# Patient Record
Sex: MALE | Race: WHITE | HISPANIC OR LATINO | Employment: OTHER | URBAN - METROPOLITAN AREA
[De-identification: names, ages, dates, MRNs, and addresses within clinical notes are randomized per-mention and may not be internally consistent; named-entity substitution may affect disease eponyms.]

---

## 2017-01-26 ENCOUNTER — HOSPITAL ENCOUNTER (EMERGENCY)
Facility: HOSPITAL | Age: 32
Discharge: HOME | End: 2017-01-26
Attending: EMERGENCY MEDICINE
Payer: MEDICAID

## 2017-01-26 VITALS
BODY MASS INDEX: 26.63 KG/M2 | RESPIRATION RATE: 18 BRPM | DIASTOLIC BLOOD PRESSURE: 62 MMHG | TEMPERATURE: 98 F | WEIGHT: 170 LBS | SYSTOLIC BLOOD PRESSURE: 120 MMHG | OXYGEN SATURATION: 97 % | HEART RATE: 56 BPM

## 2017-01-26 DIAGNOSIS — S93.401A SPRAIN OF RIGHT ANKLE, UNSPECIFIED LIGAMENT, INITIAL ENCOUNTER: Primary | ICD-10-CM

## 2017-01-26 DIAGNOSIS — R60.9 EDEMA: ICD-10-CM

## 2017-01-26 PROCEDURE — 99283 EMERGENCY DEPT VISIT LOW MDM: CPT

## 2017-01-26 PROCEDURE — 25000003 PHARM REV CODE 250: Performed by: EMERGENCY MEDICINE

## 2017-01-26 RX ORDER — IBUPROFEN 400 MG/1
800 TABLET ORAL
Status: COMPLETED | OUTPATIENT
Start: 2017-01-26 | End: 2017-01-26

## 2017-01-26 RX ORDER — IBUPROFEN 800 MG/1
800 TABLET ORAL EVERY 6 HOURS PRN
Qty: 20 TABLET | Refills: 0 | Status: SHIPPED | OUTPATIENT
Start: 2017-01-26 | End: 2017-06-26

## 2017-01-26 RX ADMIN — IBUPROFEN 800 MG: 400 TABLET, FILM COATED ORAL at 06:01

## 2017-01-26 NOTE — DISCHARGE INSTRUCTIONS
¿Qué es un esguince de tobillo?    El tobillo es la articulación en la que se unen la pierna y el pie. Los huesos están sujetados por bandas de tejido conectivo llamadas ligamentos. Cuando los ligamentos del tobillo se estiran hasta el punto de lesionarse y causar dolor, se trata de un esguince de tobillo. Un esguince puede desgarrar los ligamentos. Esos desgarros pueden ser muy pequeños, sherly aun así causar dolor. Los esguinces de tobillo pueden ser leves o graves.  ¿Cuáles son las causas de un esguince de tobillo?  Un esguince puede ocurrir si se tuerce el tobillo o lo dobla demasiado. [place] puede pasarle si tropieza o se . Las cosas que podrían aumentar las probabilidades de tener un esguince de tobillo incluyen:  · Jackie tenido un esguince de tobillo antes  · Practicar deportes que impliquen correr y saltar, o deportes de contacto tales jaylan el fútbol o el hockey  · Usar calzado que no brinde buen soporte a ginger pies y ginger tobillos  · Tener tobillos débiles y poco flexibles  Síntomas de un esguince de tobillo  Los síntomas pueden incluir lo siguiente:  · Dolor o sensibilidad en el tobillo  · Hinchazón  · Enrojecimiento o moretones  · No poder caminar o cargar peso sobre el pie afectado  · Deshawn amplitud de movimiento en el tobillo  · Sensación de desgarro o rotura en el momento en que se produce el esguince  · El tobillo se ve anormal o dislocado  · Falta de estabilidad o demasiada amplitud de movimiento en el tobillo  Tratamiento de un esguince de tobillo  El tratamiento se centra en reducir el dolor y la inflamación, y evitar ronda lesión mayor. Los tratamientos pueden incluir lo siguiente:  · Hacer reposo del tobillo. Evite cargar peso sobre el tobillo. [place] puede significar usar muletas hasta tanto el esguince se sane.  · Medicamentos analgésicos (calmantes del dolor) recetados o de venta jc. Ayudan a reducir la inflamación y el dolor.  · Compresas frías. Ayudan a reducir el dolor y la  inflamación.  · Elevar el tobillo por encima del nivel de mendoza corazón. [place] ayuda a reducir la hinchazón.  · Envolver el tobillo con ronda venda elástica o ronda tobillera. [place] ayuda a reducir la inflamación y alvaro algo de soporte al tobillo. En casos raros, puede necesitar un yeso o ronda bota ortopédica.  · Estiramiento y otros ejercicios. Mejoran la flexibilidad y la fuerza.  · Compresas de calor. Es posible que le recomienden usarlas antes de hacer ejercicios con los tobillos.  Posibles complicaciones de un esguince de tobillo  Si el tobillo ya está debilitado por mauro tenido un esguince, tiene más probabilidades de tener otros esguinces en el futuro. Hacer ejercicios para fortalecer mendoza tobillo y mejorar mendoza equilibrio puede reducir mendoza riesgo de tener futuros esguinces. Otras posibles complicaciones son dolor a flakito plazo (crónico) o que el tobillo quede inestable.  Cuándo llamar a mendoza proveedor de atención médica  Llame a mendoza proveedor de atención médica de inmediato si nota alguno de los siguientes síntomas:  · Fiebre de 100.4º F (38º C) o superior, o según le indiquen  · Dolor, entumecimiento, decoloración o frío en el pie o los dedos del pie  · Dolor que empeora  · Los síntomas no mejoran, o empeoran  · Síntomas nuevos   © [id number] [employer]. [address] [place] [address]. Todos los derechos reservados. Esta información no pretende sustituir la atención médica profesional. Sólo mendoza médico puede diagnosticar y tratar un problema de elvira.

## 2017-01-26 NOTE — ED AVS SNAPSHOT
[facility]  [address]  [place] [address]               [facility]   2017  5:53 AM   ED    Descripción:  Male : 1985   [facility]           Ortiz Cuidado fue coordinado por:     Provider Role From To    Capri Gonzalez MD Attending Provider [phone] --      Razón de la acacia     Ankle Pain           Diagnósticos de Esta Visita        Comentarios    Sprain of right ankle, unspecified ligament, initial encounter    -  Primario     Edema           ED Disposition     Ninguna           Lista de tareas           Información de seguimiento     Realice un seguimiento con[facility]  [facility]    Especialidad:  Emergency Medicine    Por qué:  If symptoms worsen    Información de contacto:    [address] [place] [address] Nasreen [place] [id number] [phone]      Recetas para recoger        Disp Refills Start End    ibuprofen (ADVIL,MOTRIN) 800 MG tablet 20 tablet 0 2017     Take 1 tablet (800 mg total) by mouth every 6 (six) hours as needed for Pain. - Oral      [facility] en [facility]lamada     [facility] En [facility] [place] de Enfermeras - Asistencia   Enfermeras registradas de [facility] pueden ayudarle a reservar ronda acacia, proveer educación para la elvira, asesoría clínica, y otros servicios de asesoramiento.   Llame para wendy servicio gratuito a [phone].             Medicamentos           Mensaje sobre Medicamentos     Verificar los cambios y / o adiciones a ortiz régimen de medicación son los mismos que discutir con ortiz médico. Si cualquiera de estos cambios o adiciones son incorrectos, por favor notifique a ortiz proveedor de atención médica.        EMPEZAR a pranay estos medicamentos NUEVOS        Refills    ibuprofen (ADVIL,MOTRIN) 800 MG tablet 0    Sig: Take 1 tablet (800 mg total) by mouth every 6 (six) hours as needed for Pain.    Categoría: Print    Vía: Oral      These medications were administered today         Dose Freq    ibuprofen tablet 800 mg 800 mg ED 1 Time    Sig: Take 2 tablets (800 mg total) by mouth ED 1 Time.    Categoría: Normal    Vía: Oral           Verifique que la siguiente lista de medicamentos es ronda representación exacta de los medicamentos que está tomando actualmente. Si no hay ningunos reportados, la lista puede estar en galarza. Si no es correcta, por favor póngase en contacto con mendoza proveedor de atención médica. Lleve esta lista con usted en luiz de emergencia.           Medicamentos Actuales     benzonatate (TESSALON) 100 MG capsule Take 1 capsule (100 mg total) by mouth 3 (three) times daily as needed for Cough.    ibuprofen (ADVIL,MOTRIN) 800 MG tablet Take 1 tablet (800 mg total) by mouth every 6 (six) hours as needed for Pain.           Información de referencia clínica           Tere signos vitales bee     PS Pulso Temperatura Resp Peso SpO2    120/62 (BP Location: Right arm, Patient Position: Sitting) 56 98.1 °F (36.7 °C) (Oral) 18 77.1 kg (170 lb) 97%    BMI (IMC)                   26.63 kg/m2           Alergias     A partir del:  1/26/2017           Reacciones    Penicillins       Vacunas     Administradas en la fecha de la visita:  1/26/2017        None      ED Micro, Lab, POCT     None      ED Imaging Orders     Start Ordered       Status Ordering Provider    01/26/17 0613 01/26/17 0612  X-Ray Ankle Complete Right  1 time imaging      Final result     01/26/17 0613 01/26/17 0612  X-Ray Foot Complete Right  1 time imaging      Final result         Instrucciones a yumiko de nicolasa         ¿Qué es un esguince de tobillo?    El tobillo es la articulación en la que se unen la pierna y el pie. Los huesos están sujetados por bandas de tejido conectivo llamadas ligamentos. Cuando los ligamentos del tobillo se estiran hasta el punto de lesionarse y causar dolor, se trata de un esguince de tobillo. Un esguince puede desgarrar los ligamentos. Esos desgarros pueden ser muy pequeños, sherly aun así causar  dolor. Los esguinces de tobillo pueden ser leves o graves.  ¿Cuáles son las causas de un esguince de tobillo?  Un esguince puede ocurrir si se tuerce el tobillo o lo dobla demasiado. [place] puede pasarle si tropieza o se . Las cosas que podrían aumentar las probabilidades de tener un esguince de tobillo incluyen:  · Jackie tenido un esguince de tobillo antes  · Practicar deportes que impliquen correr y saltar, o deportes de contacto tales jaylan el fútbol o el hockey  · Usar calzado que no brinde buen soporte a ginger pies y ginger tobillos  · Tener tobillos débiles y poco flexibles  Síntomas de un esguince de tobillo  Los síntomas pueden incluir lo siguiente:  · Dolor o sensibilidad en el tobillo  · Hinchazón  · Enrojecimiento o moretones  · No poder caminar o cargar peso sobre el pie afectado  · [place]hawn amplitud de movimiento en el tobillo  · Sensación de desgarro o rotura en el momento en que se produce el esguince  · El tobillo se ve anormal o dislocado  · Falta de estabilidad o demasiada amplitud de movimiento en el tobillo  Tratamiento de un esguince de tobillo  El tratamiento se centra en reducir el dolor y la inflamación, y evitar ronda lesión mayor. Los tratamientos pueden incluir lo siguiente:  · Hacer reposo del tobillo. Evite cargar peso sobre el tobillo. [place] puede significar usar muletas hasta tanto el esguince se sane.  · Medicamentos analgésicos (calmantes del dolor) recetados o de venta jc. Ayudan a reducir la inflamación y el dolor.  · Compresas frías. Ayudan a reducir el dolor y la inflamación.  · Elevar el tobillo por encima del nivel de mendoza corazón. [place] ayuda a reducir la hinchazón.  · Envolver el tobillo con ronda venda elástica o ronda tobillera. [place] ayuda a reducir la inflamación y alvaro algo de soporte al tobillo. En casos raros, puede necesitar un yeso o ronda bota ortopédica.  · Estiramiento y otros ejercicios. Mejoran la flexibilidad y la fuerza.  · Compresas de calor. Es posible que le recomienden  usarlas antes de hacer ejercicios con los tobillos.  Posibles complicaciones de un esguince de tobillo  Si el tobillo ya está debilitado por mauro tenido un esguince, tiene más probabilidades de tener otros esguinces en el futuro. Hacer ejercicios para fortalecer mendoza tobillo y mejorar mendoza equilibrio puede reducir mendoza riesgo de tener futuros esguinces. Otras posibles complicaciones son dolor a flakito plazo (crónico) o que el tobillo quede inestable.  Cuándo llamar a mendoza proveedor de atención médica  Llame a mendoza proveedor de atención médica de inmediato si nota alguno de los siguientes síntomas:  · Fiebre de 100.4º F (38º C) o superior, o según le indiquen  · Dolor, entumecimiento, decoloración o frío en el pie o los dedos del pie  · Dolor que empeora  · Los síntomas no mejoran, o empeoran  · Síntomas nuevos   © 0587-8372 [employer]. [address] [place] [address]. Todos los derechos reservados. Esta información no pretende sustituir la atención médica profesional. Sólo mendoza médico puede diagnosticar y tratar un problema de elvira.          Registrarse para [facility]     La activación de mendoza cuenta [facility] es tan fácil jaylan 1-2-3!    1) Ir a [email or web address], seleccione Registrarse Ahora, meter el código de activación y mendoza fecha de nacimiento, y seleccione Próximo.    [id number]  Expires: 3/12/2017  7:03 AM      2) Crear un nombre de usuario y contraseña para usar cuando se visita [facility] en el futuro y selecciona ronda pregunta de seguridad en luiz de que pierda mendoza contraseña y seleccione Próximo.    3) Introduzca mendoza dirección de correo electrónico y amina mallika en Registrarse!    Información Adicional  Si tiene alguna pregunta, por favor, e-mail [email or web address] o llame al [phone] para hablar con nuestro personal. Recuerde, [facility] no debe ser usada para necesidades urgentes. En luiz de emergencia médica, llleroy al [phone].         [facility] cumple con las leyes  federales aplicables de derechos civiles y no discrimina por motivos de kristel, color, origen nacional, edad, discapacidad, o sexo.        Language Assistance Services     ATTENTION: Language assistance services are available, free of charge. Please call [phone].      ATENCIÓN: Si habla pritesh, tiene a mendoza disposición servicios gratuitos de asistencia lingüística. Llame al [phone].     CHÚ Ý: N?u b?n nói Ti?ng Vi?t, có các d?ch v? h? tr? ngôn ng? mi?n phí dành cho b?n. G?i s? [phone].                      [facility]  [address]  [place] [address]               Lm Yip   2017  5:53 AM   ED    Description:  Male : 1985   Department:  [facility]           Your Care was Coordinated By:     Provider Role From To    Capri Gonzalez MD Attending Provider [phone] --      Reason for Visit     Ankle Pain           Diagnoses this Visit        Comments    Sprain of right ankle, unspecified ligament, initial encounter    -  Primary     Edema           ED Disposition     None           To Do List           Follow-up Information     Follow up with [facility].    Specialty:  Emergency Medicine    Why:  If symptoms worsen    Contact information:    [address] [place], [place] [address]kishore  [place] [address] [phone]       These Medications        Disp Refills Start End    ibuprofen (ADVIL,MOTRIN) 800 MG tablet 20 tablet 0 2017     Take 1 tablet (800 mg total) by mouth every 6 (six) hours as needed for Pain. - Oral      [facility]     [facility] Care [facility] -  Assistance  Registered nurses in the [facility] provide clinical advisement, health education, appointment booking, and other advisory services.  Call for this free service at [phone].             Medications           Message regarding Medications     Verify the changes and/or additions to your medication  regime listed below are the same as discussed with your clinician today.  If any of these changes or additions are incorrect, please notify your healthcare provider.        START taking these NEW medications        Refills    ibuprofen (ADVIL,MOTRIN) 800 MG tablet 0    Sig: Take 1 tablet (800 mg total) by mouth every 6 (six) hours as needed for Pain.    Class: Print    Route: Oral      These medications were administered today        Dose Freq    ibuprofen tablet 800 mg 800 mg ED 1 Time    Sig: Take 2 tablets (800 mg total) by mouth ED 1 Time.    Class: Normal    Route: Oral           Verify that the below list of medications is an accurate representation of the medications you are currently taking.  If none reported, the list may be blank. If incorrect, please contact your healthcare provider. Carry this list with you in case of emergency.           Current Medications     benzonatate (TESSALON) 100 MG capsule Take 1 capsule (100 mg total) by mouth 3 (three) times daily as needed for Cough.    ibuprofen (ADVIL,MOTRIN) 800 MG tablet Take 1 tablet (800 mg total) by mouth every 6 (six) hours as needed for Pain.           Clinical Reference Information           Your Vitals Were     BP Pulse Temp Resp Weight SpO2    120/62 (BP Location: Right arm, Patient Position: Sitting) 56 98.1 °F (36.7 °C) (Oral) 18 77.1 kg (170 lb) 97%    BMI                   26.63 kg/m2           Allergies as of 1/26/2017        Reactions    Penicillins       Immunizations Administered on Date of Encounter - 1/26/2017     None      ED Micro, Lab, POCT     None      ED Imaging Orders     Start Ordered       Status Ordering Provider    01/26/17 0613 01/26/17 0612  X-Ray Ankle Complete Right  1 time imaging      Final result     01/26/17 0613 01/26/17 0612  X-Ray Foot Complete Right  1 time imaging      Final result         Discharge Instructions         ¿Qué es un esguince de tobillo?    El tobillo es la articulación en la que se unen la pierna y  el pie. Los huesos están sujetados por bandas de tejido conectivo llamadas ligamentos. Cuando los ligamentos del tobillo se estiran hasta el punto de lesionarse y causar dolor, se trata de un esguince de tobillo. Un esguince puede desgarrar los ligamentos. Esos desgarros pueden ser muy pequeños, [place] aun así causar dolor. Los esguinces de tobillo pueden ser leves o graves.  ¿Cuáles son las causas de un esguince de tobillo?  Un esguince puede ocurrir si se tuerce el tobillo o lo dobla demasiado. [place] puede pasarle si tropieza o se . Las cosas que podrían aumentar las probabilidades de tener un esguince de tobillo incluyen:  · Jackie tenido un esguince de tobillo antes  · Practicar deportes que impliquen correr y saltar, o deportes de contacto tales jaylan el fútbol o el hockey  · Usar calzado que no brinde buen soporte a ginger pies y ginger tobillos  · Tener tobillos débiles y poco flexibles  Síntomas de un esguince de tobillo  Los síntomas pueden incluir lo siguiente:  · Dolor o sensibilidad en el tobillo  · Hinchazón  · Enrojecimiento o moretones  · No poder caminar o cargar peso sobre el pie afectado  · [place] amplitud de movimiento en el tobillo  · Sensación de desgarro o rotura en el momento en que se produce el esguince  · El tobillo se ve anormal o dislocado  · Falta de estabilidad o demasiada amplitud de movimiento en el tobillo  Tratamiento de un esguince de tobillo  El tratamiento se centra en reducir el dolor y la inflamación, y evitar ronda lesión mayor. Los tratamientos pueden incluir lo siguiente:  · Hacer reposo del tobillo. Evite cargar peso sobre el tobillo. [place] puede significar usar muletas hasta tanto el esguince se sane.  · Medicamentos analgésicos (calmantes del dolor) recetados o de venta jc. Ayudan a reducir la inflamación y el dolor.  · Compresas frías. Ayudan a reducir el dolor y la inflamación.  · Elevar el tobillo por encima del nivel de mendoza corazón. [place] ayuda a reducir la  hinchazón.  · Envolver el tobillo con ronda venda elástica o rodna tobillera. [place] ayuda a reducir la inflamación y alvaro algo de soporte al tobillo. En casos raros, puede necesitar un yeso o ronda bota ortopédica.  · Estiramiento y otros ejercicios. Mejoran la flexibilidad y la fuerza.  · Compresas de calor. Es posible que le recomienden usarlas antes de hacer ejercicios con los tobillos.  Posibles complicaciones de un esguince de tobillo  Si el tobillo ya está debilitado por mauro tenido un esguince, tiene más probabilidades de tener otros esguinces en el futuro. Hacer ejercicios para fortalecer mendoza tobillo y mejorar mendoza equilibrio puede reducir mendoza riesgo de tener futuros esguinces. Otras posibles complicaciones son dolor a flakito plazo (crónico) o que el tobillo quede inestable.  Cuándo llamar a mendoza proveedor de atención médica  Llame a mendoza proveedor de atención médica de inmediato si nota alguno de los siguientes síntomas:  · Fiebre de 100.4º F (38º C) o superior, o según le indiquen  · Dolor, entumecimiento, decoloración o frío en el pie o los dedos del pie  · Dolor que empeora  · Los síntomas no mejoran, o empeoran  · Síntomas nuevos   © 2836-8405 [employer]. [address] [place] [address], [place] [address]. Todos los derechos reservados. Esta información no pretende sustituir la atención médica profesional. Sólo mendoza médico puede diagnosticar y tratar un problema de elvira.          [facility] Sign-Up     Activating your [facility] account is as easy as 1-2-3!     1) Visit [email or web address], select Sign Up Now, enter this activation code and your date of birth, then select Next.  [email or web address]  Expires: 3/12/2017  7:03 AM      2) Create a username and password to use when you visit [facility] in the future and select a security question in case you lose your password and select Next.    3) Enter your e-mail address and click Sign Up!    Additional Information  If you have questions, please e-mail  [email or web address] or call [phone] to talk to our [facility] staff. Remember, [facility] is NOT to be used for urgent needs. For medical emergencies, dial 911.          [facility] complies with applicable Federal civil rights laws and does not discriminate on the basis of race, color, national origin, age, disability, or sex.        Language Assistance Services     ATTENTION: Language assistance services are available, free of charge. Please call [phone].      ATENCIÓN: Si habla español, tiene a mendoza disposición servicios gratuitos de asistencia lingüística. Llame al [phone].     CHÚ Ý: N?u b?n nói Ti?ng Vi?t, có các d?ch v? h? tr? ngôn ng? mi?n phí dành cho b?n. G?i s? [phone].

## 2017-01-26 NOTE — ED PROVIDER NOTES
Encounter Date: 1/26/2017       History     Chief Complaint   Patient presents with    Ankle Pain     Complaining of right ankle pain.     Review of patient's allergies indicates:   Allergen Reactions    Penicillins      The history is provided by the patient.    this a 31-year-old who complains of pain to his right foot and ankle.  Patient twisted his ankle around 8 PM.  He has not taken anything for the pain.  He rates pain as 10 out of 10 when he walks but 7 out of 10 at rest.  History reviewed. No pertinent past medical history.  No past medical history pertinent negatives.  Past Surgical History   Procedure Laterality Date    Appendectomy       History reviewed. No pertinent family history.  Social History   Substance Use Topics    Smoking status: Never Smoker    Smokeless tobacco: None    Alcohol use None     Review of Systems   Musculoskeletal: Positive for joint swelling.   Skin: Negative for color change and wound.   Neurological: Negative for numbness.       Physical Exam   Initial Vitals   BP Pulse Resp Temp SpO2   01/26/17 0554 01/26/17 0554 01/26/17 0554 01/26/17 0554 01/26/17 0554   120/62 56 18 98.1 °F (36.7 °C) 97 %     Physical Exam    Nursing note and vitals reviewed.  Constitutional: No distress.   Cardiovascular: Intact distal pulses.   Musculoskeletal: Normal range of motion. He exhibits edema and tenderness.        Feet:    See diagram   Neurological: He is oriented to person, place, and time. He has normal strength. No sensory deficit.         ED Course   Procedures  Labs Reviewed - No data to display          Medical Decision Making:   Clinical Tests:   Radiological Study: Ordered and Reviewed  ED Management:  No fracture  seen on x-ray.  Patient was treated with an Ace wrap, ice, and ibuprofen.                   ED Course     Clinical Impression:   The primary encounter diagnosis was Sprain of right ankle, unspecified ligament, initial encounter. A diagnosis of Edema was also pertinent  to this visit.          Capri Gonzalez MD  01/26/17 0704

## 2017-01-26 NOTE — ED NOTES
Patient identifiers for Lm Roberto-Turner checked and correct.  LOC: The patient is awake, alert and aware of environment with an appropriate affect, the patient is oriented x 3 and speaking appropriately.  APPEARANCE: Patient uncomfortable and in no acute distress.  SKIN: Swelling to right ankle.  MUSKULOSKELETAL: Swelling to right ankle.  +pedal pulse, capillary refill less than 3 seconds.  Good movement and sensation in toes.

## 2017-04-05 ENCOUNTER — HOSPITAL ENCOUNTER (EMERGENCY)
Facility: HOSPITAL | Age: 32
Discharge: HOME | End: 2017-04-05
Attending: EMERGENCY MEDICINE
Payer: MEDICAID

## 2017-04-05 VITALS
OXYGEN SATURATION: 98 % | HEART RATE: 100 BPM | BODY MASS INDEX: 27.56 KG/M2 | RESPIRATION RATE: 18 BRPM | SYSTOLIC BLOOD PRESSURE: 138 MMHG | HEIGHT: 65 IN | TEMPERATURE: 102 F | DIASTOLIC BLOOD PRESSURE: 69 MMHG | WEIGHT: 165.38 LBS

## 2017-04-05 DIAGNOSIS — R50.9 ACUTE FEBRILE ILLNESS: ICD-10-CM

## 2017-04-05 DIAGNOSIS — J02.0 STREP THROAT: Primary | ICD-10-CM

## 2017-04-05 LAB
DEPRECATED S PYO AG THROAT QL EIA: POSITIVE
FLUAV AG SPEC QL IA: NEGATIVE
FLUBV AG SPEC QL IA: NEGATIVE
SPECIMEN SOURCE: NORMAL

## 2017-04-05 PROCEDURE — 25000003 PHARM REV CODE 250: Performed by: NURSE PRACTITIONER

## 2017-04-05 PROCEDURE — 87400 INFLUENZA A/B EACH AG IA: CPT | Mod: 59

## 2017-04-05 PROCEDURE — 87880 STREP A ASSAY W/OPTIC: CPT

## 2017-04-05 PROCEDURE — 99283 EMERGENCY DEPT VISIT LOW MDM: CPT

## 2017-04-05 RX ORDER — ONDANSETRON 4 MG/1
4 TABLET, ORALLY DISINTEGRATING ORAL EVERY 8 HOURS PRN
Qty: 10 TABLET | Refills: 0 | Status: SHIPPED | OUTPATIENT
Start: 2017-04-05 | End: 2017-06-26

## 2017-04-05 RX ORDER — AZITHROMYCIN 250 MG/1
250 TABLET, FILM COATED ORAL DAILY
Qty: 6 TABLET | Refills: 0 | Status: SHIPPED | OUTPATIENT
Start: 2017-04-05 | End: 2017-04-15

## 2017-04-05 RX ORDER — ONDANSETRON 4 MG/1
4 TABLET, ORALLY DISINTEGRATING ORAL
Status: COMPLETED | OUTPATIENT
Start: 2017-04-05 | End: 2017-04-05

## 2017-04-05 RX ORDER — IBUPROFEN 600 MG/1
600 TABLET ORAL
Status: COMPLETED | OUTPATIENT
Start: 2017-04-05 | End: 2017-04-05

## 2017-04-05 RX ADMIN — IBUPROFEN 600 MG: 600 TABLET, FILM COATED ORAL at 08:04

## 2017-04-05 RX ADMIN — ONDANSETRON 4 MG: 4 TABLET, ORALLY DISINTEGRATING ORAL at 08:04

## 2017-04-05 NOTE — ED NOTES
Pt c/o headache, body aches, sore throat, nausea, fever, and chills since 0200 this morning. Skin is hot, dry. Respirations even, unlabored. Breath sounds clear throughout chest. Pt denies cough. Throat is reddened. Abdomen is soft, nontender to palpation.

## 2017-04-05 NOTE — ED PROVIDER NOTES
Encounter Date: 4/5/2017       History     Chief Complaint   Patient presents with    Headache     subjective fever - no OTC meds taken and temp at triage is 98.0, body aches, nausea     Review of patient's allergies indicates:   Allergen Reactions    Penicillins      HPI Comments: 30yo male here for fever, body aches, nausea, and headache since about 0200 today.  He has tried nothing for his symptoms.  He is unsure how high his fever was PTA.  No vomiting, diarrhea, or abd pain.  Former smoker.  No productive cough.      Patient is a 31 y.o. male presenting with the following complaint: general illness. The history is provided by the patient. The history is limited by a language barrier. A  was used.   Illness    The current episode started 3 to 5 hours ago. The problem occurs continuously. The problem has been unchanged. The pain is at a severity of 10/10. Nothing relieves the symptoms. Nothing aggravates the symptoms. Associated symptoms include a fever, nausea, congestion, headaches, rhinorrhea, sore throat, muscle aches, cough and URI. Pertinent negatives include no photophobia, no abdominal pain, no diarrhea, no vomiting, no ear discharge, no ear pain, no mouth sores, no stridor, no swollen glands, no neck pain, no neck stiffness, no shortness of breath, no wheezing, no rash and no eye redness. The fever began several hours ago. The fever has been unchanged since its onset. The fever has been present for less than 1 day. The temperature was taken by an oral thermometer. The maximum temperature recorded prior to his arrival was unknown. The cough has no precipitants. The cough is non-productive. Nothing relieves the cough. Nothing worsens the cough. The sore throat is characterized by pain only. Nausea began today. The nausea is exacerbated by food. He has received no recent medical care.     History reviewed. No pertinent past medical history.  Past Surgical History:   Procedure  Laterality Date    APPENDECTOMY       History reviewed. No pertinent family history.  Social History   Substance Use Topics    Smoking status: Never Smoker    Smokeless tobacco: None    Alcohol use No     Review of Systems   Constitutional: Positive for activity change, appetite change, chills and fever. Negative for fatigue.   HENT: Positive for congestion, postnasal drip, rhinorrhea and sore throat. Negative for ear discharge, ear pain, facial swelling, mouth sores, nosebleeds and trouble swallowing.    Eyes: Negative for photophobia and redness.   Respiratory: Positive for cough. Negative for chest tightness, shortness of breath, wheezing and stridor.    Cardiovascular: Negative for chest pain.   Gastrointestinal: Positive for nausea. Negative for abdominal pain, diarrhea and vomiting.   Musculoskeletal: Negative for back pain, joint swelling, myalgias, neck pain and neck stiffness.   Skin: Negative.  Negative for rash.   Allergic/Immunologic: Negative for immunocompromised state.   Neurological: Positive for headaches. Negative for dizziness and weakness.   Psychiatric/Behavioral: Negative for confusion.   All other systems reviewed and are negative.      Physical Exam   Initial Vitals   BP Pulse Resp Temp SpO2   04/05/17 0828 04/05/17 0828 04/05/17 0828 04/05/17 0828 04/05/17 0828   138/69 100 18 98 °F (36.7 °C) 98 %     Physical Exam    Nursing note and vitals reviewed.  Constitutional: Vital signs are normal. He appears well-developed and well-nourished. He is active and cooperative. He is easily aroused.  Non-toxic appearance. He does not have a sickly appearance. He appears ill. No distress.   HENT:   Head: Normocephalic and atraumatic.   Right Ear: External ear normal.   Left Ear: External ear normal.   Nose: Mucosal edema and rhinorrhea present. No sinus tenderness.   Mouth/Throat: Uvula is midline, oropharynx is clear and moist and mucous membranes are normal. No trismus in the jaw.   Mild  oropharyngeal erythema.  No tonsillar exudate or hypertrophy.     Eyes: Conjunctivae are normal.   Neck: Normal range of motion and full passive range of motion without pain. Neck supple.   Cardiovascular: Normal rate, regular rhythm and normal heart sounds.   Pulmonary/Chest: Effort normal and breath sounds normal.   Abdominal: Soft. Normal appearance and bowel sounds are normal. He exhibits no distension. There is no tenderness. There is no rigidity, no rebound, no guarding and no CVA tenderness.   Lymphadenopathy:     He has no cervical adenopathy.   Neurological: He is alert, oriented to person, place, and time and easily aroused. He has normal strength. GCS eye subscore is 4. GCS verbal subscore is 5. GCS motor subscore is 6.   Skin: Skin is warm, dry and intact. No rash noted.   Psychiatric: He has a normal mood and affect. His speech is normal and behavior is normal. Judgment and thought content normal. Cognition and memory are normal.         ED Course   Procedures  Labs Reviewed   THROAT SCREEN, RAPID - Abnormal; Notable for the following:        Result Value    Rapid Strep A Screen Positive (*)     All other components within normal limits   INFLUENZA A AND B ANTIGEN             Medical Decision Making:   Initial Assessment:   32yo male here for flu like symptoms since 0200.  Pt has tried nothing for his symptoms.  Pt appears ill but nontoxic.  Vitals stable.  HR RRR, lungs CTA.  Throat erythema, no exudate or hypertrophy.  No abscess.  Abd soft, non-tender, no r/r/g.  NO rash.  Neck normal with full nonpainful AROM.  No focal neuro findings.   Differential Diagnosis:   Influenza, viral syndrome,strep throat, URI  Clinical Tests:   Lab Tests: Ordered and Reviewed  ED Management:  Strep, influenza, PO motrin, Zofran ODT  +Strep.  Pt is tolerating PO without difficulty.  Reports improvement after ED interventions.  No sign of abscess.  RX Zithromax due to PCN allergy.  RX Zofran ODT for nausea.  Pt  encouraged to increase fluid intake and change toothbrush after 24 hours on abx.  Advised f/u with PCP or [facility] within 3 days and return to the ED if condition changes, progresses, or if there are concerns.  Pt verbalized understanding and compliance.  A  was used with this patient.                Attending Attestation:     Physician Attestation Statement for NP/PA:   I discussed this assessment and plan of this patient with the NP/PA, but I did not personally examine the patient. The face to face encounter was performed by the NP/PA.                  ED Course     Clinical Impression:   The encounter diagnosis was Strep throat.          LIZETH Vazquez  04/05/17 0950       Capri Hylton MD  04/06/17 0660

## 2017-04-05 NOTE — DISCHARGE INSTRUCTIONS
Faringitis Por Estreptococos [Pharyngitis, Strep - Confirmed]    Mendoza prueba ernesto positiva a la infección por estreptococos. Se trata de ronda enfermedad contagiosa. La puede contagiar al toser, al besar o al tocar a otras personas después de haberse tocado la boca o la nariz. Los síntomas incluyen dolor de garganta que empeora al tragar, dolor en todo el cuerpo, dolor de fatimah y fiebre. [place] tratamiento, le darán un antibiótico con el que debería empezar a sentirse mejor dentro de 1 ó 2 días.  Cuidados En [place]:  · Descanse en mendoza casa y alyssa abundante cantidad de líquidos para evitar la deshidratación.  · Deberá ausentarse del trabajo o la escuela los dos primeros días del tratamiento con antibióticos. Después de eso, la enfermedad ya no es contagiosa y, si se siente mejor, puede regresar a la escuela o el trabajo.  · Harper los antibióticos timmy los 10 días completos, incluso aunque se sienta blaze después de los primeros joselo de tratamiento. [place] es muy importante para evitar las afecciones del corazón o los riñones que pueden surgir jaylan complicación de ronda infección de garganta por estreptococo no curada.  · Niños: Use acetaminofén (Tylenol) para aliviar la fiebre, el nerviosismo y el malestar. En niños mayores de seis meses puede usar ibuprofeno (jaylan Motrin infantil) en vez de Tylenol.  [NOTA: Si el selina tiene ronda enfermedad hepática o renal crónica, o ha tenido alguna vez ronda úlcera estomacal o sangrado gastrointestinal, consulte con mendoza médico antes de darle estos medicamentos.] (La aspirina no debe usarse nunca en personas menores de 18 años enfermas con fiebre, ya que puede causar daños graves al hígado.)Adultos: Puede usar acetaminofén (Tylenol) o ibuprofeno (Motrin o Advil) para controlar la fiebre o el dolor, a menos que le hayan recetado otro medicamento. [NOTA: Si tiene ronda enfermedad hepática o renal crónica, o ha tenido alguna vez ronda úlcera estomacal o sangrado gastrointestinal, consulte con mendoza  médico antes de pranay estos medicamentos.]  · Hay comprimidos o sprays para la garganta (Chloraseptic y otros) que ayudarán a reducir el dolor. También puede hacer gárgaras con agua tibia con sal para aliviar el dolor de garganta. Disuelva ½ cucharadita de sal en 1 vaso de agua tibia. Resulta especialmente útil hacerlo antes de las comidas.  Programe ronda VISITA DE CONTROL con mendoza médico, o según le indique nuestro personal médico, si no empieza a sentirse mejor timmy la semana próxima.  Busque Prontamente Atención Médica  si algo de lo siguiente ocurre:  · Fiebre de 100.4°F (38°C) o más nicolasa, tomada oralmente, que no mejora con los medicamentos.  · Dolor nuevo o que va empeorando en los oídos, los senos paranasales o la fatimah.  · Bultos dolorosos en la parte de atrás del trenton.  · No puede tragar líquidos ni abrir blaze la boca debido al dolor de garganta.  · Tiene problemas para respirar o hace ruidos al respirar.  · Disfonía (cambios en la voz).  · Ronda nueva erupción cutánea (salpullido).  Date Last Reviewed: 4/13/2015  © 6007-1652 [employer]. [address] [place] [id number]. Todos los derechos reservados. Esta información no pretende sustituir la atención médica profesional. Sólo mendoza médico puede diagnosticar y tratar un problema de elvira.          Cuidados personales para el dolor de garganta  El dolor de garganta aparece por muchas razones, por ejemplo, resfriados, alergias e infecciones causadas por virus o bacterias. En cualquier luiz, la garganta se enrojece y duele. El objetivo del cuidado personal es reducir las molestias y permitir que la garganta se cure.    Mantenga húmeda mendoza garganta y alivie mendoza dolor  · Pruebe bebiendo un sorbo de agua apenas se despierte.  · Mantenga mendoza garganta húmeda bebiendo seis o más vasos de líquidos feliciano por día.  · Utilice un humidificador de aire frío en mendoza dormitorio timmy la noche.  · Evite el humo de cigarrillo.  · Chupe pastillas para la  garganta o la tos, dulces duros, trozos de hielo o barras de jugo de frutas helado. Consuma las versiones sin azúcar si mendoza dieta o alguna afección así lo requiere.  Hágase gárgaras para aliviar la irritación  Hacer gárgaras cada hora o dos horas puede aliviar la irritación. Pruebe con alguna de estas soluciones:  · 1/4 de cucharadita de sal en 1/2 taza de agua tibia  · Un gargarismo anestésico de venta sin receta  Use medicamentos para mayor alivio  Los medicamentos sin receta pueden reducir los síntomas de irritación de garganta. Pregunte a mendoza farmacéutico si tiene dudas acerca del tipo de medicamento que debe usar:  · Alivie el dolor con rociadores anestésicos. La aspirina o un sustituto también puede ser útil. Recuerde no yumiko nunca aspirina a alguien que tenga menos de 19 años ni si la persona ya está tomando un medicamento anticoagulante.   · Para el dolor causado por alergias, pruebe pranay medicamentos antihistamínicos para bloquear la reacción alérgica.  · Recuerde: a menos que el dolor de garganta sea causado por ronda infección bacteriana, los antibióticos no le ayudarán.  Prevenga el dolor de garganta  · Deje de fumar o reduzca el contacto con el humo de segunda mano. El humo irrita el delicado revestimiento de la garganta.  · Limite el contacto con mascotas y sustancias que causan alergias, jaylan el polen y el moho.  · Cuando esté cerca de ronda persona que tenga dolor de garganta o esté resfriada, lave ginger natalie con frecuencia para evitar la propagación de virus o bacterias.  · No esfuerce ginger cuerdas vocales.  Llame a mendoza proveedor de atención médica  Llame a mendoza médico si tiene:  · Fiebre de más de 101°F (38.3°C)  · Puntos blancos en la garganta  · Gran dificultad para tragar  · Dificultad para respirar  · Erupción  · Exposición reciente a ronda persona infectada con la bacteria estreptococo.  · Ronquera excesiva e inflamación de los ganglios en el [place] o en la mandíbula   Date Last Reviewed: 2/4/2014  ©  4456-1623 [employer]. [address] [place] [address], [place] [address]. Todos los derechos reservados. Esta información no pretende sustituir la atención médica profesional. Sólo mendoza médico puede diagnosticar y tratar un problema de elvira.

## 2017-04-05 NOTE — ED AVS SNAPSHOT
[facility]  [address]  [place] [address]               [facility]   2017  8:29 AM   ED    Descripción:  Male : 1985   Departamento:  [facility]           Mendoza Cuidado fue coordinado por:     Provider Role From To    Capri Hylton MD Attending Provider 17 0834 --    LIZETH Vazquez Nurse Practitioner 17 0831 --      Razón de la acacia     Headache           Diagnósticos de Esta Visita        Comentarios    Strep throat    -  Primario       ED Disposition     Ninguna           Lista de tareas           Información de seguimiento     Realice un seguimiento con:  [facility]    Cómo:  Luther ronda acacia lo antes posible    Cuándo:  2017    Especialidad:  Family Medicine    Información de contacto:    [address], [address]  [facility] [address] [phone]      Recetas para recoger        Disp Refills Start End    azithromycin (Z-JERMAN) 250 MG tablet 6 tablet 0 2017 4/15/2017    Take 1 tablet (250 mg total) by mouth once daily. Take first 2 tablets together, then 1 every day until finished. - Oral    ondansetron (ZOFRAN-ODT) 4 MG TbDL 10 tablet 0 2017     Take 1 tablet (4 mg total) by mouth every 8 (eight) hours as needed (nausea). - Oral      [facility]     [facility] [place] de Enfermeras - Asistencia   Enfermeras registradas de [facility] pueden ayudarle a reservar ronda acacia, proveer educación para la elvira, asesoría clínica, y otros servicios de asesoramiento.   Llame para wendy servicio gratuito a [phone].             Medicamentos           Mensaje sobre Medicamentos     Verificar los cambios y / o adiciones a mendoza régimen de medicación son los mismos que discutir con mendoza médico. Si cualquiera de estos cambios o adiciones son incorrectos, por favor notifique a mendoza proveedor de atención médica.        EMPEZAR a pranay estos medicamentos NUEVOS         "Refills    azithromycin (Z-JERMAN) 250 MG tablet 0    Sig: Take 1 tablet (250 mg total) by mouth once daily. Take first 2 tablets together, then 1 every day until finished.    Categoría: Print    Vía: Oral    ondansetron (ZOFRAN-ODT) 4 MG TbDL 0    Sig: Take 1 tablet (4 mg total) by mouth every 8 (eight) hours as needed (nausea).    Categoría: Print    Vía: Oral      These medications were administered today        Dose Freq    ibuprofen tablet 600 mg 600 mg ED 1 Time    Sig: Take 1 tablet (600 mg total) by mouth ED 1 Time.    Categoría: Normal    Vía: Oral    ondansetron disintegrating tablet 4 mg 4 mg ED 1 Time    Sig: Take 1 tablet (4 mg total) by mouth ED 1 Time.    Categoría: Normal    Vía: Oral           Verifique que la siguiente lista de medicamentos es ronda representación exacta de los medicamentos que está tomando actualmente. Si no hay ningunos reportados, la lista puede estar en galarza. Si no es correcta, por favor póngase en contacto con mendoza proveedor de atención médica. Lleve esta lista con usted en luiz de emergencia.           Medicamentos Actuales     azithromycin (Z-JERMAN) 250 MG tablet Take 1 tablet (250 mg total) by mouth once daily. Take first 2 tablets together, then 1 every day until finished.    benzonatate (TESSALON) 100 MG capsule Take 1 capsule (100 mg total) by mouth 3 (three) times daily as needed for Cough.    ibuprofen (ADVIL,MOTRIN) 800 MG tablet Take 1 tablet (800 mg total) by mouth every 6 (six) hours as needed for Pain.    ondansetron (ZOFRAN-ODT) 4 MG TbDL Take 1 tablet (4 mg total) by mouth every 8 (eight) hours as needed (nausea).           Información de referencia clínica           Tere signos vitales bee     PS Pulso Temperatura Resp Elvaston Peso    138/69 100 101.6 °F (38.7 °C) (Oral) 18 5' 5" (1.651 m) 75 kg (165 lb 5.5 oz)    SpO2 BMI (IMC)                98% 27.51 kg/m2          Alergias     A partir del:  4/5/2017           Reacciones    Penicillins       Vacunas     " Administradas en la fecha de la visita:  4/5/2017        None      ED Micro, Lab, POCT     Start Ordered       Status Ordering Provider    04/05/17 0840 04/05/17 0839  Rapid strep screen  STAT      Final result     04/05/17 0834 04/05/17 0833  Influenza antigen Nasal Swab  STAT      Final result       ED Imaging Orders     None        Instrucciones a yumiko de nicolasa         Faringitis Por Estreptococos [Pharyngitis, Strep - Confirmed]    Mendoza prueba ernesto positiva a la infección por estreptococos. Se trata de ronda enfermedad contagiosa. La puede contagiar al toser, al besar o al tocar a otras personas después de haberse tocado la boca o la nariz. Los síntomas incluyen dolor de garganta que empeora al tragar, dolor en todo el cuerpo, dolor de fatimah y fiebre. [place] tratamiento, le darán un antibiótico con el que debería empezar a sentirse mejor dentro de 1 ó 2 días.  Cuidados En [place]:  · Descanse en mendoza casa y alyssa abundante cantidad de líquidos para evitar la deshidratación.  · Deberá ausentarse del trabajo o la escuela los dos primeros días del tratamiento con antibióticos. Después de eso, la enfermedad ya no es contagiosa y, si se siente mejor, puede regresar a la escuela o el trabajo.  · [place] los antibióticos timmy los 10 días completos, incluso aunque se sienta blaze después de los primeros joselo de tratamiento. [place] es muy importante para evitar las afecciones del corazón o los riñones que pueden surgir jaylan complicación de ronda infección de garganta por estreptococo no curada.  · Niños: Use acetaminofén (Tylenol) para aliviar la fiebre, el nerviosismo y el malestar. En niños mayores de seis meses puede usar ibuprofeno (jaylan Motrin infantil) en vez de Tylenol.  [NOTA: Si el selina tiene ronda enfermedad hepática o renal crónica, o ha tenido alguna vez ronda úlcera estomacal o sangrado gastrointestinal, consulte con mendoza médico antes de darle estos medicamentos.] (La aspirina no debe usarse nunca en personas menores de 18 años  enfermas con fiebre, ya que puede causar daños graves al hígado.)Adultos: Puede usar acetaminofén (Tylenol) o ibuprofeno (Motrin o Advil) para controlar la fiebre o el dolor, a menos que le hayan recetado otro medicamento. [NOTA: Si tiene ronda enfermedad hepática o renal crónica, o ha tenido alguna vez ronda úlcera estomacal o sangrado gastrointestinal, consulte con mendoza médico antes de pranay estos medicamentos.]  · Hay comprimidos o sprays para la garganta (Chloraseptic y otros) que ayudarán a reducir el dolor. También puede hacer gárgaras con agua tibia con sal para aliviar el dolor de garganta. Disuelva ½ cucharadita de sal en 1 vaso de agua tibia. Resulta especialmente útil hacerlo antes de las comidas.  Programe ronda VISITA DE CONTROL con mendoza médico, o según le indique nuestro personal médico, si no empieza a sentirse mejor timmy la semana próxima.  Busque Prontamente Atención Médica  si algo de lo siguiente ocurre:  · Fiebre de 100.4°F (38°C) o más nicolasa, tomada oralmente, que no mejora con los medicamentos.  · Dolor nuevo o que va empeorando en los oídos, los senos paranasales o la fatimah.  · Bultos dolorosos en la parte de atrás del trenton.  · No puede tragar líquidos ni abrir blaze la boca debido al dolor de garganta.  · Tiene problemas para respirar o hace ruidos al respirar.  · Disfonía (cambios en la voz).  · Ronda nueva erupción cutánea (salpullido).  Date Last Reviewed: 4/13/2015  © 0698-4861 [employer]. [address] [place] [address], [place] [address]. Todos los derechos reservados. Esta información no pretende sustituir la atención médica profesional. Sólo mendoza médico puede diagnosticar y tratar un problema de elvira.          Cuidados personales para el dolor de garganta  El dolor de garganta aparece por muchas razones, por ejemplo, resfriados, alergias e infecciones causadas por virus o bacterias. En cualquier luiz, la garganta se enrojece y duele. El objetivo del cuidado personal es reducir las  molestias y permitir que la garganta se cure.    Mantenga húmeda mendoza garganta y alivie mendoza dolor  · Pruebe bebiendo un sorbo de agua apenas se despierte.  · Mantenga mendoza garganta húmeda bebiendo seis o más vasos de líquidos feliciano por día.  · Utilice un humidificador de aire frío en mendoza dormitorio timmy la noche.  · Evite el humo de cigarrillo.  · Chupe pastillas para la garganta o la tos, dulces duros, trozos de hielo o barras de jugo de frutas helado. Consuma las versiones sin azúcar si mendoza dieta o alguna afección así lo requiere.  Hágase gárgaras para aliviar la irritación  Hacer gárgaras cada hora o dos horas puede aliviar la irritación. Pruebe con alguna de estas soluciones:  · 1/4 de cucharadita de sal en 1/2 taza de agua tibia  · Un gargarismo anestésico de venta sin receta  Use medicamentos para mayor alivio  Los medicamentos sin receta pueden reducir los síntomas de irritación de garganta. Pregunte a mendoza farmacéutico si tiene dudas acerca del tipo de medicamento que debe usar:  · Alivie el dolor con rociadores anestésicos. La aspirina o un sustituto también puede ser útil. Recuerde no yumiko nunca aspirina a alguien que tenga menos de 19 años ni si la persona ya está tomando un medicamento anticoagulante.   · Para el dolor causado por alergias, pruebe pranay medicamentos antihistamínicos para bloquear la reacción alérgica.  · Recuerde: a menos que el dolor de garganta sea causado por ronda infección bacteriana, los antibióticos no le ayudarán.  Prevenga el dolor de garganta  · Deje de fumar o reduzca el contacto con el humo de segunda mano. El humo irrita el delicado revestimiento de la garganta.  · Limite el contacto con mascotas y sustancias que causan alergias, jaylan el polen y el moho.  · Cuando esté cerca de ronda persona que tenga dolor de garganta o esté resfriada, lave ginger natalie con frecuencia para evitar la propagación de virus o bacterias.  · No esfuerce ginger cuerdas vocales.  Llame a mendoza proveedor de atención  médica  Llame a mendoza médico si tiene:  · Fiebre de más de 101°F (38.3°C)  · Puntos blancos en la garganta  · Gran dificultad para tragar  · Dificultad para respirar  · Erupción  · Exposición reciente a ronda persona infectada con la bacteria estreptococo.  · Ronquera excesiva e inflamación de los ganglios en el trenton o en la mandíbula   Date Last Reviewed: 2/4/2014  © 3320-3824 [employer]. [address] [place] [address]. Todos los derechos reservados. Esta información no pretende sustituir la atención médica profesional. Sólo mendoza médico puede diagnosticar y tratar un problema de elvira.          Registrarse para [facility]     La activación de mendoza cuenta [facility] es tan fácil jaylan 1-2-3!    1) Ir a [email or web address], seleccione Registrarse Ahora, meter el código de activación y mendoza fecha de nacimiento, y seleccione Próximo.    [id number]  Expires: 5/20/2017  9:37 AM      2) Crear un nombre de usuario y contraseña para usar cuando se visita [facility] en el futuro y selecciona ronda pregunta de seguridad en luiz de que pierda mendoza contraseña y seleccione Próximo.    3) Introduzca mendoza dirección de correo electrónico y amina [facility] en Registrarse!    Información Adicional  Si tiene alguna pregunta, por favor, e-mail [email or web address] o llame al [phone] para hablar con nuestro personal. Recuerde, [facility] no debe ser usada para necesidades urgentes. En luiz de emergencia médica, llame al 911.         [facility] cumple con las leyes federales aplicables de derechos civiles y no discrimina por motivos de kristel, color, origen nacional, edad, discapacidad, o sexo.        Language Assistance Services     ATTENTION: Language assistance services are available, free of charge. Please call [phone].      ATENCIÓN: Si habla español, tiene a mendoza disposición servicios gratuitos de asistencia lingüística. Llame al [phone].     CHÚ Ý: N?u b?n nói Ti?ng Vi?t, có các d?ch v? h? tr?  ngôn ng? mi?n phí dành cho b?n. G?i s? [phone].                      [facility]  [address]  [facility] [address]               Lm Yip   2017  8:29 AM   ED    Description:  Male : 1985   Department:  [facility]           Your Care was Coordinated By:     Provider Role From To    Capri Hylton MD Attending Provider 17 2787 --    LIZETH Vazquez Nurse Practitioner 1745 --      Reason for Visit     Headache           Diagnoses this Visit        Comments    Strep throat    -  Primary       ED Disposition     None           To Do List           Follow-up Information     Follow up with [facility]. Schedule an appointment as soon as possible for a visit in 3 days.    Specialty:  Family Medicine    Contact information:    [address], [address]  [facility] [address] [phone]       These Medications        Disp Refills Start End    azithromycin (Z-JERMAN) 250 MG tablet 6 tablet 0 2017 4/15/2017    Take 1 tablet (250 mg total) by mouth once daily. Take first 2 tablets together, then 1 every day until finished. - Oral    ondansetron (ZOFRAN-ODT) 4 MG TbDL 10 tablet 0 2017     Take 1 tablet (4 mg total) by mouth every 8 (eight) hours as needed (nausea). - Oral      [facility]     [facility] Nurse Care Line - 24/ Assistance  Unless otherwise directed by your provider, please contact [facility], our nurse care line that is available for  assistance.     Registered nurses in the [facility] provide: appointment scheduling, clinical advisement, health education, and other advisory services.  Call: [phone] (toll free)               Medications           Message regarding Medications     Verify the changes and/or additions to your medication regime listed below are the same as discussed with your clinician today.  If any of these  "changes or additions are incorrect, please notify your healthcare provider.        START taking these NEW medications        Refills    azithromycin (Z-JERMAN) 250 MG tablet 0    Sig: Take 1 tablet (250 mg total) by mouth once daily. Take first 2 tablets together, then 1 every day until finished.    Class: Print    Route: Oral    ondansetron (ZOFRAN-ODT) 4 MG TbDL 0    Sig: Take 1 tablet (4 mg total) by mouth every 8 (eight) hours as needed (nausea).    Class: Print    Route: Oral      These medications were administered today        Dose Freq    ibuprofen tablet 600 mg 600 mg ED 1 Time    Sig: Take 1 tablet (600 mg total) by mouth ED 1 Time.    Class: Normal    Route: Oral    ondansetron disintegrating tablet 4 mg 4 mg ED 1 Time    Sig: Take 1 tablet (4 mg total) by mouth ED 1 Time.    Class: Normal    Route: Oral           Verify that the below list of medications is an accurate representation of the medications you are currently taking.  If none reported, the list may be blank. If incorrect, please contact your healthcare provider. Carry this list with you in case of emergency.           Current Medications     azithromycin (Z-JERMAN) 250 MG tablet Take 1 tablet (250 mg total) by mouth once daily. Take first 2 tablets together, then 1 every day until finished.    benzonatate (TESSALON) 100 MG capsule Take 1 capsule (100 mg total) by mouth 3 (three) times daily as needed for Cough.    ibuprofen (ADVIL,MOTRIN) 800 MG tablet Take 1 tablet (800 mg total) by mouth every 6 (six) hours as needed for Pain.    ondansetron (ZOFRAN-ODT) 4 MG TbDL Take 1 tablet (4 mg total) by mouth every 8 (eight) hours as needed (nausea).           Clinical Reference Information           Your Vitals Were     BP Pulse Temp Resp Height Weight    138/69 100 101.6 °F (38.7 °C) (Oral) 18 5' 5" (1.651 m) 75 kg (165 lb 5.5 oz)    SpO2 BMI                98% 27.51 kg/m2          Allergies as of 4/5/2017        Reactions    Penicillins     "   Immunizations Administered on Date of Encounter - 4/5/2017     None      ED Micro, Lab, POCT     Start Ordered       Status Ordering Provider    04/05/17 0840 04/05/17 0839  Rapid strep screen  STAT      Final result     04/05/17 0834 04/05/17 0833  Influenza antigen Nasal Swab  STAT      Final result       ED Imaging Orders     None        Discharge Instructions         Faringitis Por Estreptococos [Pharyngitis, Strep - Confirmed]    Mendoza prueba ernesto positiva a la infección por estreptococos. Se trata de ronda enfermedad contagiosa. La puede contagiar al toser, al besar o al tocar a otras personas después de haberse tocado la boca o la nariz. Los síntomas incluyen dolor de garganta que empeora al tragar, dolor en todo el cuerpo, dolor de fatimah y fiebre. April tratamiento, le darán un antibiótico con el que debería empezar a sentirse mejor dentro de 1 ó 2 días.  Cuidados En [place]:  · Descanse en mendoza casa y alyssa abundante cantidad de líquidos para evitar la deshidratación.  · Deberá ausentarse del trabajo o la escuela los dos primeros días del tratamiento con antibióticos. Después de eso, la enfermedad ya no es contagiosa y, si se siente mejor, puede regresar a la escuela o el trabajo.  · [place] los antibióticos timmy los 10 días completos, incluso aunque se sienta blaze después de los primeros joselo de tratamiento. [place] es muy importante para evitar las afecciones del corazón o los riñones que pueden surgir april complicación de ronda infección de garganta por estreptococo no curada.  · Niños: Use acetaminofén (Tylenol) para aliviar la fiebre, el nerviosismo y el malestar. En niños mayores de seis meses puede usar ibuprofeno (april Motrin infantil) en vez de Tylenol.  [NOTA: Si el selina tiene ronda enfermedad hepática o renal crónica, o ha tenido alguna vez ronda úlcera estomacal o sangrado gastrointestinal, consulte con mendoza médico antes de darle estos medicamentos.] (La aspirina no debe usarse nunca en personas menores de 18  años enfermas con fiebre, ya que puede causar daños graves al hígado.)Adultos: Puede usar acetaminofén (Tylenol) o ibuprofeno (Motrin o Advil) para controlar la fiebre o el dolor, a menos que le hayan recetado otro medicamento. [NOTA: Si tiene ronda enfermedad hepática o renal crónica, o ha tenido alguna vez ronda úlcera estomacal o sangrado gastrointestinal, consulte con mendoza médico antes de pranay estos medicamentos.]  · Hay comprimidos o sprays para la garganta (Chloraseptic y otros) que ayudarán a reducir el dolor. También puede hacer gárgaras con agua tibia con sal para aliviar el dolor de garganta. Disuelva ½ cucharadita de sal en 1 vaso de agua tibia. Resulta especialmente útil hacerlo antes de las comidas.  Programe ronda VISITA DE CONTROL con mendoza médico, o según le indique nuestro personal médico, si no empieza a sentirse mejor timmy la semana próxima.  Busque Prontamente Atención Médica  si algo de lo siguiente ocurre:  · Fiebre de 100.4°F (38°C) o más nicolasa, tomada oralmente, que no mejora con los medicamentos.  · Dolor nuevo o que va empeorando en los oídos, los senos paranasales o la fatimah.  · Bultos dolorosos en la parte de atrás del trenton.  · No puede tragar líquidos ni abrir blaze la boca debido al dolor de garganta.  · Tiene problemas para respirar o hace ruidos al respirar.  · Disfonía (cambios en la voz).  · Ronda nueva erupción cutánea (salpullido).  Date Last Reviewed: 4/13/2015  © 0249-2940 [employer]. [address] [place] [address], [place] [address]. Todos los derechos reservados. Esta información no pretende sustituir la atención médica profesional. Sólo mendoza médico puede diagnosticar y tratar un problema de elvira.          Cuidados personales para el dolor de garganta  El dolor de garganta aparece por muchas razones, por ejemplo, resfriados, alergias e infecciones causadas por virus o bacterias. En cualquier luiz, la garganta se enrojece y duele. El objetivo del cuidado personal es reducir las  médica  Llame a mendoza médico si tiene:  · Fiebre de más de 101°F (38.3°C)  · Puntos blancos en la garganta  · Gran dificultad para tragar  · Dificultad para respirar  · Erupción  · Exposición reciente a ronda persona infectada con la bacteria estreptococo.  · Ronquera excesiva e inflamación de los ganglios en el trenton o en la mandíbula   Date Last Reviewed: 2/4/2014  © 5664-7309 [employer]. [address] [place] [address]. Todos los derechos reservados. Esta información no pretende sustituir la atención médica profesional. Sólo mendoza médico puede diagnosticar y tratar un problema de elvira.          [facility] Sign-Up     Activating your [facility] account is as easy as 1-2-3!     1) Visit [email or web address], select Sign Up Now, enter this activation code and your date of birth, then select Next.  [email or web address]  Expires: 5/20/2017  9:37 AM      2) Create a username and password to use when you visit [facility] in the future and select a security question in case you lose your password and select Next.    3) Enter your e-mail address and click Sign Up!    Additional Information  If you have questions, please e-mail [email or web address] or call [phone] to talk to our [facility] staff. Remember, [facility] is NOT to be used for urgent needs. For medical emergencies, dial 911.          [facility] complies with applicable Federal civil rights laws and does not discriminate on the basis of race, color, national origin, age, disability, or sex.        Language Assistance Services     ATTENTION: Language assistance services are available, free of charge. Please call [phone].      ATENCIÓN: Si habla español, tiene a mendoza disposición servicios gratuitos de asistencia lingüística. Llame al [phone].     CHÚ Ý: N?u b?n nói Ti?ng Vi?t, có các d?ch v? h? tr? ngôn ng? mi?n phí dành cho b?n. G?i s? [phone].

## 2017-06-26 ENCOUNTER — HOSPITAL ENCOUNTER (EMERGENCY)
Facility: HOSPITAL | Age: 32
Discharge: HOME | End: 2017-06-26
Attending: EMERGENCY MEDICINE
Payer: MEDICAID

## 2017-06-26 VITALS
DIASTOLIC BLOOD PRESSURE: 80 MMHG | HEIGHT: 66 IN | TEMPERATURE: 96 F | SYSTOLIC BLOOD PRESSURE: 140 MMHG | RESPIRATION RATE: 14 BRPM | HEART RATE: 70 BPM | WEIGHT: 180 LBS | BODY MASS INDEX: 28.93 KG/M2 | OXYGEN SATURATION: 97 %

## 2017-06-26 DIAGNOSIS — J06.9 VIRAL URI WITH COUGH: Primary | ICD-10-CM

## 2017-06-26 DIAGNOSIS — R05.9 COUGH: ICD-10-CM

## 2017-06-26 LAB
DEPRECATED S PYO AG THROAT QL EIA: NEGATIVE
FLUAV AG SPEC QL IA: NEGATIVE
FLUBV AG SPEC QL IA: NEGATIVE
SPECIMEN SOURCE: NORMAL

## 2017-06-26 PROCEDURE — 87400 INFLUENZA A/B EACH AG IA: CPT | Mod: 59

## 2017-06-26 PROCEDURE — 99284 EMERGENCY DEPT VISIT MOD MDM: CPT

## 2017-06-26 PROCEDURE — 87880 STREP A ASSAY W/OPTIC: CPT

## 2017-06-26 PROCEDURE — 87081 CULTURE SCREEN ONLY: CPT

## 2017-06-26 RX ORDER — LIDOCAINE HYDROCHLORIDE 20 MG/ML
SOLUTION OROPHARYNGEAL
Qty: 100 ML | Refills: 0 | Status: SHIPPED | OUTPATIENT
Start: 2017-06-26 | End: 2018-08-04

## 2017-06-26 RX ORDER — BENZONATATE 100 MG/1
100 CAPSULE ORAL 3 TIMES DAILY PRN
Qty: 20 CAPSULE | Refills: 0 | Status: SHIPPED | OUTPATIENT
Start: 2017-06-26 | End: 2017-07-06

## 2017-06-26 NOTE — ED PROVIDER NOTES
Encounter Date: 6/26/2017       History     Chief Complaint   Patient presents with    Cough     coughing, fever, sinus pressure, headache for 10 days     30 y/o male presents with cough, sore throat, frontal HA and sinus pressure x 10 days.  Pt daughter and wife are sick with similar symptoms. Pt denies fever and chills.  He has not checked his temp at home.  No n/v.  + good po intake.  No rash.  No sob/wheezing.  + runny nose of clear drainage.  No exacerbating factors.  No relieving factors.  No difficulty or painful swallowing.            Review of patient's allergies indicates:   Allergen Reactions    Penicillins      History reviewed. No pertinent past medical history.  Past Surgical History:   Procedure Laterality Date    APPENDECTOMY       History reviewed. No pertinent family history.  Social History   Substance Use Topics    Smoking status: Never Smoker    Smokeless tobacco: Never Used    Alcohol use No     Review of Systems   Constitutional: Positive for appetite change. Negative for activity change, chills, diaphoresis, fatigue and fever.   HENT: Positive for congestion, rhinorrhea, sinus pressure and sore throat. Negative for ear pain, trouble swallowing and voice change.    Eyes: Negative for pain, discharge and redness.   Respiratory: Positive for cough. Negative for chest tightness, shortness of breath and wheezing.    Cardiovascular: Negative for chest pain and palpitations.   Gastrointestinal: Negative for abdominal pain, nausea and vomiting.   Endocrine: Negative for polydipsia and polyphagia.   Genitourinary: Negative.    Musculoskeletal: Negative for myalgias.   Skin: Negative for pallor and rash.   Allergic/Immunologic: Negative for immunocompromised state.   Neurological: Positive for headaches. Negative for dizziness.   Hematological: Negative for adenopathy.   Psychiatric/Behavioral: Negative.    All other systems reviewed and are negative.      Physical Exam     Initial Vitals  [06/26/17 1619]   BP Pulse Resp Temp SpO2   128/73 71 16 98.3 °F (36.8 °C) --      MAP       91.33         Physical Exam    Nursing note and vitals reviewed.  Constitutional: He appears well-developed and well-nourished. He is not diaphoretic. No distress.   HENT:   Head: Normocephalic and atraumatic.   Nose: Mucosal edema present.   Mouth/Throat: Uvula is midline and mucous membranes are normal. Posterior oropharyngeal edema and posterior oropharyngeal erythema present. No oropharyngeal exudate or tonsillar abscesses.   Eyes: Conjunctivae are normal. Right eye exhibits no discharge. Left eye exhibits no discharge.   Neck: Normal range of motion. Neck supple.   Cardiovascular: Normal rate, regular rhythm and normal heart sounds. Exam reveals no gallop and no friction rub.    No murmur heard.  Pulmonary/Chest: Breath sounds normal. No respiratory distress. He has no wheezes. He has no rhonchi. He has no rales. He exhibits no tenderness.   Abdominal: Soft. Bowel sounds are normal. He exhibits no distension. There is no tenderness.   Musculoskeletal: Normal range of motion.   Neurological: He is alert and oriented to person, place, and time.   Skin: Skin is warm and dry.   Psychiatric: He has a normal mood and affect. His behavior is normal.         ED Course   Procedures  Labs Reviewed   THROAT SCREEN, RAPID   INFLUENZA A AND B ANTIGEN             Medical Decision Making:   Initial Assessment:   30 y/o male with cough and sore throat.  No n/v.  No fever/chills.  No sob or wheezing.  On exam, lungs are CTA.   Differential Diagnosis:   DDX:  Viral URI, strep pharyngitis, viral pharyngitis, pneumonia, bronchitis, influenza  Clinical Tests:   Lab Tests: Ordered and Reviewed       <> Summary of Lab: Flu neg  Strep neg  ED Management:  CXR neg.  Flu neg.  Strep neg.  Vitals remain stable and pt will be d/c home with rx for tessalon perles.  Pt is instructed to f/u with pcp for re-evaluation.      Pt counseled on their  diagnosis and the importance of following up with PCP.  Pt also cautioned on when to return to ED.  Pt verbalizes understanding of discharge plan and will return to ED immediately if symptoms worsen                     ED Course     Clinical Impression:   The primary encounter diagnosis was Viral URI with cough. A diagnosis of Cough was also pertinent to this visit.    Disposition:   Disposition: Discharged  Condition: Stable                        Rain Hadley MD  06/26/17 2181

## 2017-06-26 NOTE — ED NOTES
Pt reports cough with subjective fever and body aches for the past 10 days, pt awake alert in no acute distress, denies chest pain or sob, pt family in room with family, no redness or swelling noted to throat, breath sounds all fields posterior clear,

## 2017-06-30 LAB — BACTERIA THROAT CULT: NORMAL

## 2017-12-03 ENCOUNTER — HOSPITAL ENCOUNTER (EMERGENCY)
Facility: HOSPITAL | Age: 32
Discharge: HOME | End: 2017-12-04
Attending: EMERGENCY MEDICINE
Payer: MEDICAID

## 2017-12-03 DIAGNOSIS — B96.89 ACUTE BACTERIAL PHARYNGITIS: ICD-10-CM

## 2017-12-03 DIAGNOSIS — J02.9 SORE THROAT: Primary | ICD-10-CM

## 2017-12-03 DIAGNOSIS — J02.8 ACUTE BACTERIAL PHARYNGITIS: ICD-10-CM

## 2017-12-03 LAB
ANION GAP SERPL CALC-SCNC: 9 MMOL/L
BUN SERPL-MCNC: 16 MG/DL
CALCIUM SERPL-MCNC: 9.2 MG/DL
CHLORIDE SERPL-SCNC: 105 MMOL/L
CO2 SERPL-SCNC: 20 MMOL/L
CREAT SERPL-MCNC: 1.2 MG/DL
DEPRECATED S PYO AG THROAT QL EIA: NEGATIVE
ERYTHROCYTE [DISTWIDTH] IN BLOOD BY AUTOMATED COUNT: 12.5 %
EST. GFR  (AFRICAN AMERICAN): >60 ML/MIN/1.73 M^2
EST. GFR  (NON AFRICAN AMERICAN): >60 ML/MIN/1.73 M^2
FLUAV AG SPEC QL IA: NEGATIVE
FLUBV AG SPEC QL IA: NEGATIVE
GLUCOSE SERPL-MCNC: 121 MG/DL
HCT VFR BLD AUTO: 43.7 %
HGB BLD-MCNC: 15.8 G/DL
MCH RBC QN AUTO: 30.7 PG
MCHC RBC AUTO-ENTMCNC: 36.2 G/DL
MCV RBC AUTO: 85 FL
PLATELET # BLD AUTO: 258 K/UL
PMV BLD AUTO: 10.5 FL
POTASSIUM SERPL-SCNC: 3.7 MMOL/L
RBC # BLD AUTO: 5.15 M/UL
SODIUM SERPL-SCNC: 134 MMOL/L
SPECIMEN SOURCE: NORMAL
WBC # BLD AUTO: 17.58 K/UL

## 2017-12-03 PROCEDURE — 87081 CULTURE SCREEN ONLY: CPT

## 2017-12-03 PROCEDURE — 87880 STREP A ASSAY W/OPTIC: CPT

## 2017-12-03 PROCEDURE — 25000003 PHARM REV CODE 250: Performed by: EMERGENCY MEDICINE

## 2017-12-03 PROCEDURE — 99284 EMERGENCY DEPT VISIT MOD MDM: CPT | Mod: 25

## 2017-12-03 PROCEDURE — 87147 CULTURE TYPE IMMUNOLOGIC: CPT

## 2017-12-03 PROCEDURE — 96360 HYDRATION IV INFUSION INIT: CPT

## 2017-12-03 PROCEDURE — 87400 INFLUENZA A/B EACH AG IA: CPT | Mod: 59

## 2017-12-03 PROCEDURE — 80048 BASIC METABOLIC PNL TOTAL CA: CPT

## 2017-12-03 PROCEDURE — 85027 COMPLETE CBC AUTOMATED: CPT

## 2017-12-03 PROCEDURE — 63600175 PHARM REV CODE 636 W HCPCS: Performed by: EMERGENCY MEDICINE

## 2017-12-03 RX ORDER — IBUPROFEN 400 MG/1
800 TABLET ORAL
Status: COMPLETED | OUTPATIENT
Start: 2017-12-03 | End: 2017-12-03

## 2017-12-03 RX ORDER — AZITHROMYCIN 500 MG/1
500 TABLET, FILM COATED ORAL DAILY
Qty: 5 TABLET | Refills: 0 | Status: SHIPPED | OUTPATIENT
Start: 2017-12-03 | End: 2017-12-04 | Stop reason: CLARIF

## 2017-12-03 RX ORDER — ACETAMINOPHEN 500 MG
1000 TABLET ORAL
Status: COMPLETED | OUTPATIENT
Start: 2017-12-03 | End: 2017-12-03

## 2017-12-03 RX ORDER — SODIUM CHLORIDE 9 MG/ML
1000 INJECTION, SOLUTION INTRAVENOUS
Status: COMPLETED | OUTPATIENT
Start: 2017-12-03 | End: 2017-12-03

## 2017-12-03 RX ADMIN — IBUPROFEN 800 MG: 400 TABLET, FILM COATED ORAL at 09:12

## 2017-12-03 RX ADMIN — SODIUM CHLORIDE 1000 ML: 0.9 INJECTION, SOLUTION INTRAVENOUS at 11:12

## 2017-12-03 RX ADMIN — ACETAMINOPHEN 1000 MG: 500 TABLET ORAL at 10:12

## 2017-12-03 RX ADMIN — DEXAMETHASONE 10 MG: 4 TABLET ORAL at 09:12

## 2017-12-04 VITALS
DIASTOLIC BLOOD PRESSURE: 64 MMHG | RESPIRATION RATE: 18 BRPM | TEMPERATURE: 98 F | HEIGHT: 66 IN | OXYGEN SATURATION: 97 % | WEIGHT: 180 LBS | SYSTOLIC BLOOD PRESSURE: 126 MMHG | HEART RATE: 76 BPM | BODY MASS INDEX: 28.93 KG/M2

## 2017-12-04 PROCEDURE — 25500020 PHARM REV CODE 255: Performed by: EMERGENCY MEDICINE

## 2017-12-04 RX ORDER — AZITHROMYCIN 500 MG/1
500 TABLET, FILM COATED ORAL DAILY
Qty: 5 TABLET | Refills: 0 | Status: SHIPPED | OUTPATIENT
Start: 2017-12-04 | End: 2017-12-09

## 2017-12-04 RX ADMIN — IOHEXOL 75 ML: 350 INJECTION, SOLUTION INTRAVENOUS at 12:12

## 2017-12-04 NOTE — ED PROVIDER NOTES
Encounter Date: 12/3/2017    SCRIBE #1 NOTE: I, Shay Zaman, am scribing for, and in the presence of,  Dr. Gigi Chadn. I have scribed the entire note.       History     Chief Complaint   Patient presents with    Sore Throat     reports sore throat and fever. Reports took tylenol at 4 for fever.      Time patient was seen by the provider: 8:54 PM    The patient is a 32 y.o. male that presents to the ED with a complaint of a sore throat and fever. He reports that the pain began earlier today, and he took 1 pill of acetaminophen 4 hours ago to manage the pain. He reports no improvement. He has not taken anything else for his symptoms. He denies associated nausea/vomiting, abdominal pain, rash, CP/SOB. He is allergic to penicillin which causes generalized rash and throat swelling.                Review of patient's allergies indicates:   Allergen Reactions    Penicillins      No past medical history on file.  Past Surgical History:   Procedure Laterality Date    APPENDECTOMY       No family history on file.  Social History   Substance Use Topics    Smoking status: Never Smoker    Smokeless tobacco: Never Used    Alcohol use No     Review of Systems   Constitutional: Positive for fever. Negative for chills.   HENT: Positive for sore throat.    Respiratory: Negative for shortness of breath.    Cardiovascular: Negative for chest pain.   Gastrointestinal: Negative for abdominal pain, constipation, diarrhea, nausea and vomiting.   Genitourinary: Negative for dysuria, frequency and urgency.   Musculoskeletal: Negative for back pain.   Skin: Negative for rash and wound.   Neurological: Negative for weakness.   Hematological: Does not bruise/bleed easily.   Psychiatric/Behavioral: Negative for agitation, behavioral problems and confusion.       Physical Exam     Vitals:    12/03/17 2151 12/03/17 2218 12/03/17 2347 12/04/17 0115   BP: 127/66 138/63  126/64   BP Location: Right arm Right arm  Left arm   Patient Position:  Lying Lying  Lying   Pulse: (!) 111 (!) 113  76   Resp: 20 20  18   Temp: (!) 101.4 °F (38.6 °C) (!) 102.5 °F (39.2 °C) 99.6 °F (37.6 °C) 97.5 °F (36.4 °C)   TempSrc: Oral Oral Oral Oral   SpO2: 97% 97%  97%   Weight:       Height:             Physical Exam    Nursing note and vitals reviewed.  Constitutional: He appears well-developed and well-nourished.   HENT:   Head: Normocephalic and atraumatic.   Mouth/Throat: Oropharyngeal exudate (tonsillar, R side) present.   Erythema with tonsillar swelling, exudate on the right side   Eyes: Conjunctivae and EOM are normal. Pupils are equal, round, and reactive to light.   Neck: Normal range of motion. Neck supple.   Full ROM   Cardiovascular: Normal rate, regular rhythm, normal heart sounds and intact distal pulses. Exam reveals no gallop and no friction rub.    No murmur heard.  Pulmonary/Chest: Breath sounds normal. He has no wheezes. He has no rhonchi. He has no rales.   Abdominal: Soft. He exhibits no distension. There is no tenderness.   Musculoskeletal: Normal range of motion.   Neurological: He is alert and oriented to person, place, and time.   Skin: No rash noted. No erythema.   Psychiatric: He has a normal mood and affect.         ED Course   Procedures  Labs Reviewed   CBC WITHOUT DIFFERENTIAL - Abnormal; Notable for the following:        Result Value    WBC 17.58 (*)     MCHC 36.2 (*)     All other components within normal limits   BASIC METABOLIC PANEL - Abnormal; Notable for the following:     Sodium 134 (*)     CO2 20 (*)     Glucose 121 (*)     All other components within normal limits   THROAT SCREEN, RAPID   CULTURE, STREP A,  THROAT   INFLUENZA A AND B ANTIGEN        Imaging Results          CT Soft Tissue Neck With Contrast (Final result)  Result time 12/04/17 01:00:50    Final result by Adolfo Sanches MD (12/04/17 01:00:50)                 Impression:       1.  Significant inflammatory changes in the oropharyngeal regions especially in the peritonsillar  regions.  No definite evidence of drainable collection.  The findings are consistent with acute tonsillitis with significant exudates.  Mild narrowing of the oropharyngeal airway.  Short-term followup is suggested for evaluation of peritonsillar abscess.    2.  Extensive lymphadenopathy in the jugular chain distribution, most consistent with reactive lymphadenopathy.              Electronically signed by: KEYUR ESCALANTE MD  Date:     12/04/17  Time:    01:00              Narrative:    Exam: [id number]  12/04/17  00:14:54 [id number] ([facility]) : CT SOFT TISSUE NECK WITH CONTRAST    Technique:    Axial CT scan of the neck was performed from the level of the skull base through the thoracic inlet after the intravenous administration of 75 cc of Omni 350 IV. Coronal and Sagittal Reformats were also obtained.     Comparison:     None     Findings:      The visualized portions of the skull base are within normal limits.  There is mucosal thickening in the bilateral maxillary and ethmoid sinuses.  The mastoid air cells are clear.    The oral cavity is within normal limits.  The nasopharynx is within normal limits.  There are extensive inflammatory changes in the oropharynx especially within the peritonsillar regions.  No discrete drainable collection is identified.  There is associated mild narrowing of the oropharyngeal airway.    The parapharyngeal soft tissues are within normal limits.  There is no evidence of retropharyngeal effusion.  The larynx is unremarkable.  The hypopharyngeal regions are within normal limits.    The parotid, submandibular, and thyroid gland are within normal limits.  There are extensive lymph nodes enlargement in the internal jugular chain distribution.  There is no evidence of suppurative adenopathy.    There is normal intravascular enhancement.    The trachea is unremarkable.  The visualized lung apices are within normal limits.  The osseous structures are unremarkable.                                  Medical Decision Making:   Initial Assessment:   33 y/o male presents to ED with a sore throat for the last 13 hours. Febrile on arrival. Exam with right side tonsillar erythema with exudates, will obtain rapid strep test and treat fever.  Differential Diagnosis:   Differential Diagnosis includes, but is not limited to:  Abdiel's angina, epiglottitis, foreign body aspiration, retropharyngeal abscess, peritonsillar abscess, esophageal perforation, mediastinitis, esophagitis, sialolithiasis, parotitis, laryngitis, tracheitis, dental/periapical abscess, TMJ disorder, pneumonia, Streptococcal/bacterial pharyngitis, viral pharyngitis.  Clinical Tests:   Lab Tests: Ordered and Reviewed  Radiological Study: Ordered and Reviewed  ED Management:  Strep negative.  Fever increased in Ed, patient remained tachycardic. Will obtain IV and given IVF, obtain labs.  Lab with WBC 17k. Given persistent fever and symptoms, will obtain CT nec to rule out deep space infection.  CT without abscess or fluid collection. Symptoms improved after IVF.  Will RX azithromycin to cover Strep, as patient allergic to PCN.   F/u PCP or return to ED for worsening symptoms, persistent fever, N/V, inability to tolerate Po, difficulty breathing/swallowing, or any other concerns.  Upon re-evaluation, the patient's status has improved.    After complete ED evaluation, clinical impression is most consistent with pharyngitis.    At this time, I feel there is no emergent condition requiring further evaluation or admission. I believe the patient is stable for discharge from the ED. The patient and any additional family present were updated with test results, overall clinical impression, and recommended further plan of care. All questions were answered. The patient expressed understanding and agreed with current plan for discharge with PCP follow-up within 1 week. Strict return precautions were provided, including return/worsening of current symptoms or any  other concerns.                      ED Course      Clinical Impression:     1. Sore throat    2. Acute bacterial pharyngitis            Disposition:   Disposition: Discharged  Condition: Stable       I, Dr. Gigi Chand, personally performed the services described in this documentation. All medical record entries made by the scribe were at my direction and in my presence.  I have reviewed the chart and agree that the record reflects my personal performance and is accurate and complete.     Gigi Chand MD.             Gigi Chand MD  12/14/17 1798

## 2017-12-04 NOTE — ED NOTES
Pt presents to ED w/ c/o sore throat, productive cough, nasal congestion, fever, and generalized body aches for 13hrs. Pt reports he took tyelnol around 4hrs ago. Pt reports slight abd tenderness on palpation.

## 2017-12-06 LAB — BACTERIA THROAT CULT: NORMAL

## 2017-12-06 NOTE — PROVIDER PROGRESS NOTES - EMERGENCY DEPT.
Encounter Date: 12/3/2017    ED Physician Progress Notes         12/06/2017 9:04 AM Patient was treated with Zithromax; culture is showing group A strep. LC

## 2018-07-13 ENCOUNTER — HOSPITAL ENCOUNTER (EMERGENCY)
Facility: HOSPITAL | Age: 33
Discharge: LEFT BEFORE DISCHARGE | End: 2018-07-14
Attending: EMERGENCY MEDICINE
Payer: MEDICAID

## 2018-07-13 VITALS
RESPIRATION RATE: 18 BRPM | HEIGHT: 68 IN | OXYGEN SATURATION: 99 % | TEMPERATURE: 98 F | WEIGHT: 200 LBS | HEART RATE: 77 BPM | DIASTOLIC BLOOD PRESSURE: 75 MMHG | BODY MASS INDEX: 30.31 KG/M2 | SYSTOLIC BLOOD PRESSURE: 128 MMHG

## 2018-07-13 DIAGNOSIS — B34.9 PHARYNGITIS WITH VIRAL SYNDROME: Primary | ICD-10-CM

## 2018-07-13 DIAGNOSIS — J02.9 PHARYNGITIS WITH VIRAL SYNDROME: Primary | ICD-10-CM

## 2018-07-13 PROCEDURE — 87400 INFLUENZA A/B EACH AG IA: CPT

## 2018-07-13 PROCEDURE — 87880 STREP A ASSAY W/OPTIC: CPT | Mod: 59

## 2018-07-13 PROCEDURE — 87147 CULTURE TYPE IMMUNOLOGIC: CPT

## 2018-07-13 PROCEDURE — 86308 HETEROPHILE ANTIBODY SCREEN: CPT

## 2018-07-13 PROCEDURE — 87081 CULTURE SCREEN ONLY: CPT

## 2018-07-13 PROCEDURE — 99283 EMERGENCY DEPT VISIT LOW MDM: CPT

## 2018-07-14 LAB
DEPRECATED S PYO AG THROAT QL EIA: NEGATIVE
FLUAV AG SPEC QL IA: NEGATIVE
FLUBV AG SPEC QL IA: NEGATIVE
HETEROPH AB SERPL QL IA: NEGATIVE
SPECIMEN SOURCE: NORMAL

## 2018-07-14 RX ORDER — IBUPROFEN 600 MG/1
600 TABLET ORAL EVERY 6 HOURS PRN
Qty: 20 TABLET | Refills: 0 | Status: SHIPPED | OUTPATIENT
Start: 2018-07-14 | End: 2018-08-04

## 2018-07-14 NOTE — ED NOTES
[facility]  LOC: The patient is awake, alert and aware of environment with an appropriate affect, the patient is oriented x 3 and speaking appropriately.  APPEARANCE: Patient resting comfortably and in no acute distress, patient is clean and well groomed, patient's clothing are properly fastened. Throat red.  SKIN: The skin is warm and dry, patient has normal skin turgor and moist mucus membranes, skin intact, no breakdown or brusing noted.  MUSKULOSKELETAL: Patient moving all extremities well, no obvious swelling or deformities noted.  RESPIRATORY: Airway is open and patent, respirations are spontaneous, patient has a normal effort and rate.

## 2018-07-14 NOTE — ED PROVIDER NOTES
Encounter Date: 7/13/2018       History     Chief Complaint   Patient presents with    Sore Throat     32y M ambulatory to ED with c/o sore throat x2 day.        URI   The primary symptoms include fever, fatigue, sore throat and myalgias. Primary symptoms do not include cough or abdominal pain. The current episode started two days ago. This is a new problem. The problem has not changed since onset.The fever began 1 to 2 days ago. The fever has been gradually improving since its onset.   The fatigue began 2 days ago.   The sore throat began 2 days ago. The sore throat has been unchanged since its onset.   Myalgias began 2 days ago.     Review of patient's allergies indicates:   Allergen Reactions    Penicillins      No past medical history on file.  Past Surgical History:   Procedure Laterality Date    APPENDECTOMY       No family history on file.  Social History   Substance Use Topics    Smoking status: Never Smoker    Smokeless tobacco: Never Used    Alcohol use No     Review of Systems   Constitutional: Positive for fatigue and fever.   HENT: Positive for sore throat.    Respiratory: Negative for cough and shortness of breath.    Cardiovascular: Negative for chest pain.   Gastrointestinal: Negative for abdominal pain.   Musculoskeletal: Positive for myalgias.   All other systems reviewed and are negative.      Physical Exam     Initial Vitals [07/13/18 2229]   BP Pulse Resp Temp SpO2   128/75 77 18 98 °F (36.7 °C) 99 %      MAP       --         Physical Exam    Nursing note and vitals reviewed.  Constitutional: He appears well-developed and well-nourished. He is not diaphoretic. No distress.   HENT:   Head: Normocephalic.   Right Ear: External ear normal.   Left Ear: External ear normal.   Mouth/Throat: Oropharyngeal exudate (slight) present.   Eyes: EOM are normal. Pupils are equal, round, and reactive to light.   Mild conjunctival injection bilaterally     Neck: Normal range of motion. Neck supple. No  thyromegaly present. No tracheal deviation present.   Cardiovascular: Normal rate, regular rhythm and intact distal pulses.   No murmur heard.  Pulmonary/Chest: Breath sounds normal. No stridor. No respiratory distress. He has no wheezes. He has no rhonchi. He has no rales.   Abdominal: Soft. He exhibits no distension. There is no tenderness. There is no rebound and no guarding.   Musculoskeletal: Normal range of motion. He exhibits no edema or tenderness.   Lymphadenopathy:     He has no cervical adenopathy.   Neurological: He is alert and oriented to person, place, and time. He has normal strength.   Skin: Skin is warm and dry. Capillary refill takes less than 2 seconds. No rash noted.   Psychiatric: He has a normal mood and affect.         ED Course   Procedures  Labs Reviewed   THROAT SCREEN, RAPID   CULTURE, STREP A,  THROAT   INFLUENZA A AND B ANTIGEN   HETEROPHILE AB SCREEN          Imaging Results    None          Medical Decision Making:   ED Management:  Patient complains of sore throat for 2 days, coupled with generalized myalgias subjective fever.  No headache or neck stiffness. nremarkable physical exam otherwise.   Testing unremarkable, suspect pharyngitis viral syndrome.  Prior to discussion regarding results, patient eloped from ED without telling MD or staff.                        Clinical Impression:   The encounter diagnosis was Pharyngitis with viral syndrome.      Disposition:   Disposition: Discharged  Condition: Stable                        Guy J. Lefort, MD  07/14/18 0146       Guy J. Lefort, MD  07/14/18 0158

## 2018-07-16 ENCOUNTER — TELEPHONE (OUTPATIENT)
Dept: EMERGENCY MEDICINE | Facility: HOSPITAL | Age: 33
End: 2018-07-16

## 2018-07-16 DIAGNOSIS — J02.0 STREP PHARYNGITIS: Primary | ICD-10-CM

## 2018-07-16 LAB — BACTERIA THROAT CULT: NORMAL

## 2018-07-16 RX ORDER — AZITHROMYCIN 250 MG/1
500 TABLET, FILM COATED ORAL DAILY
Qty: 10 TABLET | Refills: 0 | Status: SHIPPED | OUTPATIENT
Start: 2018-07-16 | End: 2018-07-21

## 2018-07-16 NOTE — TELEPHONE ENCOUNTER
Pt's throat culture positive for strep- azithromycin sent to [facility] for patient. Patient notified. Pt is allergic to penicillins.

## 2018-08-04 ENCOUNTER — HOSPITAL ENCOUNTER (EMERGENCY)
Facility: HOSPITAL | Age: 33
Discharge: HOME | End: 2018-08-04
Attending: EMERGENCY MEDICINE
Payer: MEDICAID

## 2018-08-04 VITALS
SYSTOLIC BLOOD PRESSURE: 127 MMHG | WEIGHT: 200 LBS | DIASTOLIC BLOOD PRESSURE: 67 MMHG | HEART RATE: 78 BPM | OXYGEN SATURATION: 95 % | RESPIRATION RATE: 16 BRPM | HEIGHT: 68 IN | TEMPERATURE: 98 F | BODY MASS INDEX: 30.31 KG/M2

## 2018-08-04 DIAGNOSIS — T15.91XA FOREIGN BODY OF RIGHT EYE, INITIAL ENCOUNTER: Primary | ICD-10-CM

## 2018-08-04 PROCEDURE — 99284 EMERGENCY DEPT VISIT MOD MDM: CPT | Mod: 25

## 2018-08-04 PROCEDURE — 65220 REMOVE FOREIGN BODY FROM EYE: CPT | Mod: RT

## 2018-08-04 PROCEDURE — 63600175 PHARM REV CODE 636 W HCPCS: Performed by: NURSE PRACTITIONER

## 2018-08-04 PROCEDURE — 25000003 PHARM REV CODE 250: Performed by: NURSE PRACTITIONER

## 2018-08-04 RX ORDER — ERYTHROMYCIN 5 MG/G
OINTMENT OPHTHALMIC
Qty: 1 TUBE | Refills: 0 | Status: SHIPPED | OUTPATIENT
Start: 2018-08-04 | End: 2020-02-01 | Stop reason: SDUPTHER

## 2018-08-04 RX ORDER — ERYTHROMYCIN 5 MG/G
OINTMENT OPHTHALMIC
Status: COMPLETED | OUTPATIENT
Start: 2018-08-04 | End: 2018-08-04

## 2018-08-04 RX ORDER — PROPARACAINE HYDROCHLORIDE 5 MG/ML
1 SOLUTION/ DROPS OPHTHALMIC
Status: COMPLETED | OUTPATIENT
Start: 2018-08-04 | End: 2018-08-04

## 2018-08-04 RX ADMIN — FLUORESCEIN SODIUM AND BENOXINATE HYDROCHLORIDE 1 DROP: 4; 2.5 SOLUTION OPHTHALMIC at 02:08

## 2018-08-04 RX ADMIN — ERYTHROMYCIN: 5 OINTMENT OPHTHALMIC at 02:08

## 2018-08-04 RX ADMIN — PROPARACAINE HYDROCHLORIDE 1 DROP: 5 SOLUTION/ DROPS OPHTHALMIC at 04:08

## 2018-08-04 NOTE — DISCHARGE INSTRUCTIONS
Take prescribed medication as labeled. Follow-up with Dr. Clark in opthalmology within 2-3 days. Return to ED if symptoms worsen or change.

## 2018-08-04 NOTE — ED NOTES
Pt here c/o right eye continues to have redness and crow and he thinks he still has abnormal appearing in eyseen at 1500 at pupil. e. Has been on drops from a clinic for several days. Small amount vision changes.PCN allergy.  Pt poor historian. RAN translation used. NP performed.

## 2018-08-04 NOTE — ED PROVIDER NOTES
"Encounter Date: 8/4/2018       History     Chief Complaint   Patient presents with    Foreign Body in Eye     pt reports metal object in right eye occured 2 days ago. c/o blurry vision and pain to eye. redness noted.      Patient is a 32-year-old male who denies past medical history who presents to ED with foreign body in right eye x5 days. Pt reports that he is a  and "thinks" that he got a piece of metal stuck in right eye 5 days ago. Pt denies foreign object in right eye x 2 days as reported to triage. Patient denies wearing contacts.  Pt associates right eye redness, pain, and "small" changes in vision. Pt reports that he went to  clinic and has been on "liquid for eyes." Poor historian. Pt denies any exacerbating or alleviating factors. Pt denies fever, chills, neck pain/stiffness, photosensitivity, cough/congestion, sore throat, nausea, and vomiting. Pt denies any other complaints at this time.       The history is provided by the patient. The history is limited by a language barrier. A  was used.     Review of patient's allergies indicates:   Allergen Reactions    Penicillins Swelling     History reviewed. No pertinent past medical history.  Past Surgical History:   Procedure Laterality Date    APPENDECTOMY       History reviewed. No pertinent family history.  Social History   Substance Use Topics    Smoking status: Never Smoker    Smokeless tobacco: Never Used    Alcohol use No     Review of Systems   Constitutional: Negative for chills and fever.   HENT: Negative for sore throat.    Eyes: Positive for pain, redness and visual disturbance (vision changes). Negative for photophobia, discharge and itching.   Respiratory: Negative for cough.    Cardiovascular: Negative for chest pain.   Gastrointestinal: Negative for nausea and vomiting.   Musculoskeletal: Negative for back pain, neck pain and neck stiffness.   Skin: Negative for rash.   Hematological: Does not bruise/bleed " easily.       Physical Exam     Initial Vitals   BP Pulse Resp Temp SpO2   08/04/18 1330 08/04/18 1330 08/04/18 1330 08/04/18 1339 08/04/18 1330   127/67 78 16 97.8 °F (36.6 °C) 95 %      MAP       --                Physical Exam    Constitutional: Vital signs are normal. He appears well-developed and well-nourished. He is not diaphoretic. He is active.  Non-toxic appearance. He does not have a sickly appearance.   HENT:   Head: Normocephalic.   Right Ear: Hearing normal.   Left Ear: Hearing normal.   Nose: Nose normal.   Mouth/Throat: Uvula is midline, oropharynx is clear and moist and mucous membranes are normal.   Eyes: EOM are normal. Pupils are equal, round, and reactive to light. Right eye exhibits discharge (clear). Right eye exhibits no chemosis, no exudate and no hordeolum. Foreign body present in the right eye. Left eye exhibits no chemosis, no discharge, no exudate and no hordeolum. No foreign body present in the left eye. Right conjunctiva is injected. Right conjunctiva has no hemorrhage. Left conjunctiva is not injected. Left conjunctiva has no hemorrhage. No scleral icterus. Right eye exhibits normal extraocular motion and no nystagmus. Left eye exhibits normal extraocular motion and no nystagmus.   Foreign body noted at 3:00 in right eye.    Neck: Trachea normal, normal range of motion, full passive range of motion without pain and phonation normal. Neck supple.   Cardiovascular: Normal rate, regular rhythm, normal heart sounds and normal pulses.   Pulmonary/Chest: Effort normal and breath sounds normal.   Neurological: He is alert and oriented to person, place, and time. Gait normal. GCS eye subscore is 4. GCS verbal subscore is 5. GCS motor subscore is 6.   Skin: Skin is warm, dry and intact. Capillary refill takes less than 2 seconds. No rash noted.   Psychiatric: He has a normal mood and affect.         ED Course   Foreign Body  Date/Time: 8/4/2018 2:33 PM  Performed by: LUIS A WARREN  BALJINDER  Authorized by: VERONICA BRUNO   Consent Done: Not Needed  Body area: eye  Location details: right cornea    Anesthesia:  Local Anesthetic: proparacaine drops  Patient sedated: no  Patient restrained: no  Localization method: visualized  Removal mechanism: 25-gauge needle and irrigation  Eye examined with fluorescein.  No fluorescein uptake.  No residual rust ring present.  Dressing: antibiotic ointment  Depth: superficial  Complexity: simple  2 objects recovered.  Post-procedure assessment: foreign body removed  Patient tolerance: Patient tolerated the procedure well with no immediate complications      Labs Reviewed - No data to display       Imaging Results    None          Medical Decision Making:   History:   I obtained history from: someone other than patient.       <> Summary of History: Dede  # [id number]  Initial Assessment:   Patient presents to ED with foreign body and right eye x5 days.  Patient appears well, nontoxic.  Patient afebrile.  Foreign body noted at 3:00 in right eye.   Differential Diagnosis:   Foreign body in eye, corneal abrasion  ED Management:  Eye exam with Wood's lamp, fluorescein drops, visual acuity, foreign body removal, erythromycin ointment   Foreign body removed, please see notes.  Patient is stable and will be DC home with prescription for erythromycin ointment.  Patient instructed to follow up with ophthalmologist within 2-3 days and return to ED if symptoms worsen or change.    Rx: erythromycin ointment               Attending Attestation:     Physician Attestation Statement for NP/PA:   I discussed this assessment and plan of this patient with the NP/PA, but I did not personally examine the patient. The face to face encounter was performed by the NP/PA.    Other NP/PA Attestation Additions:    History of Present Illness: 32M with FB in R eye x 2 days.  Thinks a piece of metal may have gotten in it.    Medical Decision Making: FB seen on NP's exam and removed.   Discharge with erythromycin ointment and close ophtho follow up.                    Clinical Impression:   The encounter diagnosis was Foreign body of right eye, initial encounter.                             Anastacio De Luna NP  08/04/18 5535       Janet Reid MD  08/05/18 [id number]

## 2020-02-01 ENCOUNTER — HOSPITAL ENCOUNTER (EMERGENCY)
Facility: HOSPITAL | Age: 35
Discharge: HOME | End: 2020-02-02
Attending: EMERGENCY MEDICINE

## 2020-02-01 DIAGNOSIS — S05.01XA CORNEAL ABRASION, RIGHT, INITIAL ENCOUNTER: Primary | ICD-10-CM

## 2020-02-01 DIAGNOSIS — R51.9 FACIAL PAIN: ICD-10-CM

## 2020-02-01 PROCEDURE — 25000003 PHARM REV CODE 250: Performed by: EMERGENCY MEDICINE

## 2020-02-01 PROCEDURE — 99284 EMERGENCY DEPT VISIT MOD MDM: CPT | Mod: 25

## 2020-02-01 RX ORDER — ERYTHROMYCIN 5 MG/G
OINTMENT OPHTHALMIC
Qty: 1 TUBE | Refills: 0 | Status: SHIPPED | OUTPATIENT
Start: 2020-02-01

## 2020-02-01 RX ORDER — PROPARACAINE HYDROCHLORIDE 5 MG/ML
1 SOLUTION/ DROPS OPHTHALMIC
Status: COMPLETED | OUTPATIENT
Start: 2020-02-01 | End: 2020-02-01

## 2020-02-01 RX ORDER — IBUPROFEN 800 MG/1
800 TABLET ORAL EVERY 6 HOURS PRN
Qty: 12 TABLET | Refills: 0 | Status: SHIPPED | OUTPATIENT
Start: 2020-02-01 | End: 2020-02-04

## 2020-02-01 RX ADMIN — PROPARACAINE HYDROCHLORIDE 1 DROP: 5 SOLUTION/ DROPS OPHTHALMIC at 11:02

## 2020-02-01 RX ADMIN — FLUORESCEIN SODIUM 1 EACH: 1 STRIP OPHTHALMIC at 11:02

## 2020-02-02 VITALS
SYSTOLIC BLOOD PRESSURE: 132 MMHG | HEIGHT: 67 IN | OXYGEN SATURATION: 100 % | RESPIRATION RATE: 18 BRPM | DIASTOLIC BLOOD PRESSURE: 78 MMHG | WEIGHT: 195 LBS | TEMPERATURE: 98 F | BODY MASS INDEX: 30.61 KG/M2 | HEART RATE: 78 BPM

## 2020-02-02 NOTE — ED PROVIDER NOTES
Encounter Date: 2/1/2020    SCRIBE #1 NOTE: I, Ria Lopez, am scribing for, and in the presence of,  Dr. Foster. I have scribed the entire note.       History     Chief Complaint   Patient presents with    Eye Injury     Fell against a wall and hit his R eye. Denies visual changes or drainage. Scleral redness noted. Reports painful 8/10       Patient is a 34-year-old  male presents to the ED with complaint of eye injury. Patient states that he sustained a mechanical trip and fall in which the right side of his face hit a wall.  He now complains of tenderness to the lateral aspect of the right orbit as well as foreign body sensation to the right eye.  He denies any vision changes or difficulty with eye movements when asked.  Furthermore denies any nausea, vomiting or any other complaints at this time.  Patient does not were contact lenses.    The history is provided by the patient. The history is limited by a language barrier. A  was used.     Review of patient's allergies indicates:   Allergen Reactions    Penicillins Swelling     No past medical history on file.  Past Surgical History:   Procedure Laterality Date    APPENDECTOMY       No family history on file.  Social History     Tobacco Use    Smoking status: Never Smoker    Smokeless tobacco: Never Used   Substance Use Topics    Alcohol use: No    Drug use: No     Review of Systems   Constitutional: Negative for chills, fatigue and fever.   HENT: Negative for sore throat.    Eyes: Positive for pain and redness. Negative for photophobia, discharge and visual disturbance.   Respiratory: Negative for shortness of breath.    Cardiovascular: Negative for chest pain.   Gastrointestinal: Negative for abdominal pain, diarrhea, nausea and vomiting.   Genitourinary: Negative for dysuria and hematuria.   Musculoskeletal: Negative for back pain.   Skin: Negative for rash.   Neurological: Negative for headaches.       Physical Exam      Initial Vitals [02/01/20 2220]   BP Pulse Resp Temp SpO2   (!) 168/93 68 16 98.3 °F (36.8 °C) 97 %      MAP       --         Physical Exam    Nursing note and vitals reviewed.  Constitutional: He appears well-developed and well-nourished. He is not diaphoretic. No distress.   HENT:   Head: Normocephalic.       Mouth/Throat: Oropharynx is clear and moist.   No crepitus   No bony abnormality noted on palpation   Area of tenderness noted on illustration      Eyes: Conjunctivae and EOM are normal. Pupils are equal, round, and reactive to light. Right eye exhibits no discharge. No foreign body present in the right eye. Left eye exhibits no discharge.   R scleral redness   No proptosis  No findings suggestive of retrobulbar hematoma   Extraocular movements intact  No hyphema   No hypopyon  Corneal abrasion to right eye on fluroscein stain  R Eyelid was flipped with no foreign body   Pupils equal and reactive to light 2 mm bilaterally   No ocular discharge   No findings suggest no corneal ulcer  No Siedel sign   No teardrop pupil   IOP WNL    Neck: Normal range of motion. Neck supple.   Cardiovascular: Normal rate, regular rhythm, normal heart sounds and intact distal pulses.   Pulmonary/Chest: Breath sounds normal. No respiratory distress.   Abdominal: Soft. There is no tenderness.   Musculoskeletal: Normal range of motion. He exhibits no edema or tenderness.   Lymphadenopathy:     He has no cervical adenopathy.   Neurological: He is alert and oriented to person, place, and time. He has normal strength.   Skin: Skin is warm and dry. No rash noted.   Psychiatric: He has a normal mood and affect.         ED Course   Procedures  Labs Reviewed - No data to display       Imaging Results          CT Maxillofacial Without Contrast (Final result)  Result time 02/01/20 23:04:27    Final result by Tacho Aiken MD (02/01/20 23:04:27)                 Impression:      No acute facial fractures identified.      Electronically  signed by: Tacho Aiken MD  Date:    02/01/2020  Time:    23:04             Narrative:    EXAMINATION:  CT MAXILLOFACIAL WITHOUT CONTRAST    CLINICAL HISTORY:  Maxface trauma blunt;    TECHNIQUE:  Low dose axial images, sagittal and coronal reformations were obtained through the face.  Contrast was not administered.    COMPARISON:  None    FINDINGS:  No acute facial fractures are identified.  Cervical spine alignment is within normal limits.  No evidence of acute cervical spine fracture or subluxation.  There is minimal bilateral maxillary sinus disease.  Remaining visualized paranasal sinuses and mastoid air cells are clear.  Orbits show no significant abnormalities.  Visualized portion of the brain shows no significant abnormalities.                                 Medical Decision Making:   Clinical Tests:   Radiological Study: Ordered and Reviewed  ED Management:  - CT max/face negative for acute fracture or other abnormality per final radiology read  - Physical exam unremarkable other than likely R corneal abrasion  - Will discharge to home with rx for erythromycin ointment, ibuprofen (Patient denies any history or current GI bleeding, renal disease, or current use of blood thinners)  - Pt instructed to f/u with his eye doctor in next 48 hours for a recheck of today's complaints  - Pt given strict return precautions for any new or worsening symptoms  - No further intervention is indicated at this time after having taken into account the patient's history, physical exam findings, and empirical and objective data obtained during the patient's emergency department workup.   - The patient is at low risk for an emergent medical condition at this time, and I am of the belief that that it is safe to discharge the patient from the emergency department.   - The patient is instructed to follow up as outpatient as indicated on the discharge paperwork.    - I have discussed the specifics of the workup with the patient  and the patient has verbalized understanding of the details of the workup, the diagnosis, the treatment plan, and the need for outpatient follow-up.    - Although the patient has no emergent etiology today this does not preclude the development of an emergent condition so, in addition, I have advised the patient that they can return to the ED and/or activate EMS at any time with worsening of their symptoms, change of their symptoms, or with any other medical complaint.    - The patient remained comfortable and stable during their visit in the ED.    - Discharge and follow-up instructions discussed with the patient who expressed understanding and willingness to comply with my recommendations.  - Results of all emergency department tests  discussed thoroughly with patient; all patient questions answered; pt in agreement with plan  - Pt instructed to follow up with PCP in 2-3 days for recheck of today's complaints  - Pt given strict emergency department return precautions for any new or worsening of symptoms  - Pt discharged from the emergency department in stable condition, in no acute distress                                   Clinical Impression:       ICD-10-CM ICD-9-CM   1. Corneal abrasion, right, initial encounter S05.01XA 918.1   2. Facial pain R51 784.0         Disposition:   Disposition: Discharged  Condition: Stable      I, Matteo Foster,  personally performed the services described in this documentation. All medical record entries made by the scribe were at my direction and in my presence.  I have reviewed the chart and agree that the record reflects my personal performance and is accurate and complete. Matteo Foster M.D. 11:57 PM02/02/2020               Matteo Foster MD  02/01/20 2357       Matteo Foster MD  02/02/20 0006

## 2020-02-02 NOTE — DISCHARGE INSTRUCTIONS
Follow up with your eye doctor in the next 48 hours    Return to the emergency department for any new or worsening of symptoms     Siga con mendoza oftalmólogo en las próximas 48 horas    Regrese al servicio de urgencias para cualquier síntoma nuevo o empeoramiento

## 2020-02-04 PROCEDURE — 99284 EMERGENCY DEPT VISIT MOD MDM: CPT

## 2020-02-05 ENCOUNTER — HOSPITAL ENCOUNTER (EMERGENCY)
Facility: HOSPITAL | Age: 35
Discharge: HOME | End: 2020-02-05
Attending: EMERGENCY MEDICINE

## 2020-02-05 VITALS
SYSTOLIC BLOOD PRESSURE: 121 MMHG | BODY MASS INDEX: 30.61 KG/M2 | HEART RATE: 65 BPM | RESPIRATION RATE: 18 BRPM | DIASTOLIC BLOOD PRESSURE: 81 MMHG | TEMPERATURE: 98 F | WEIGHT: 195 LBS | OXYGEN SATURATION: 98 %

## 2020-02-05 DIAGNOSIS — R06.02 SOB (SHORTNESS OF BREATH): ICD-10-CM

## 2020-02-05 DIAGNOSIS — R09.89 CHOKING SENSATION: ICD-10-CM

## 2020-02-05 DIAGNOSIS — K21.9 GASTROESOPHAGEAL REFLUX DISEASE, ESOPHAGITIS PRESENCE NOT SPECIFIED: Primary | ICD-10-CM

## 2020-02-05 RX ORDER — PANTOPRAZOLE SODIUM 20 MG/1
20 TABLET, DELAYED RELEASE ORAL DAILY
Qty: 30 TABLET | Refills: 0 | Status: SHIPPED | OUTPATIENT
Start: 2020-02-05 | End: 2021-02-04

## 2020-02-05 NOTE — ED NOTES
Pt presents to the ED with c/o choking. Pt states for the last year he has been having episodes of feeling like his food is coming back up his throat. Pt states it most often happens when he over eats. Pt states tonight's episode was worse than usual and he woke up feeling like he could not breathe. Pt states he vomited shortly after and now has discomfort in his throat. Pt denies SOB at this time but states he would like to know why he is having these episodes. Pt's speech is clear and fluent without hoarseness.

## 2020-02-05 NOTE — ED PROVIDER NOTES
Chief Complaint   Patient presents with    Choking     Woke up because he was choking.  Several minutes states he could not breath.  Vomited.        HPI    Lm Yip 34 y.o. presents to the emergency department today with a complaint of sensation of choking. He reports that tonight he woke up feeling like he was choking. Over the past few months he has been havin gthe sensation that his food is coming back p on him at night when he lies down. He reports feeling it the worst tonight, he vomited and he felt as though he may have aspirated. He denies current SOB, palpitations, chest pain, hemoptysis, leg swelling.     ROS    Constitutional: No fever, no chills.  Eyes: No discharge. No pain.  HENT: No nasal drainage. No ear ache. No sore throat.  Cardiovascular: No chest pain, no palpitations.  Respiratory: No cough, no shortness of breath.  Gastrointestinal: No abdominal pain, no vomiting. No diarrhea.  Genitourinary: No hematuria, dysuria, urgency.  Musculoskeletal: No back pain.   Skin: No rashes, no lesions.  Neurological: No headache, no focal weakness.    Otherwise remaining ROS negative     The history is provided by the patient  A  was utilized.     ALLERGIES REVIEWED  MEDICATIONS REVIEWED  PMH/PSH/SOC/FH REVIEWED       No past medical history on file.      Past Surgical History:   Procedure Laterality Date    APPENDECTOMY           Social History     Tobacco Use    Smoking status: Never Smoker    Smokeless tobacco: Never Used   Substance Use Topics    Alcohol use: No    Drug use: No       No family history on file.    Nursing/Ancillary staff note reviewed.  VS reviewed         Physical Exam     /81 (BP Location: Left arm, Patient Position: Sitting)   Pulse 65   Temp 98.4 °F (36.9 °C) (Oral)   Resp 18   Wt 88.5 kg (195 lb)   SpO2 98%   BMI 30.61 kg/m²     Physical Exam    General Appearance: The patient is alert, has no immediate need for airway protection and no  signs of toxicity. No acute distress. Lying in bed but able to sit up without difficulty.   HEENT: Eyes: Pupils equal and round no pallor or injection. Extra ocular movements intact. No drainage.       Mouth: Mucous membranes are moist. Oropharynx clear.   Neck:Neck is supple non-tender. No lymphadenopathy. No stridor.   Respiratory: There are no retractions, lungs are clear to auscultation. No wheezing, no crackles. Chest wall nontender to palpation.   Cardiovascular: Regular rate and rhythm. No murmurs, rubs or gallops.  Gastrointestinal:  Abdomen is soft and non-tender, no masses, bowel sounds normal. No guarding, no rebound.  No pulsatile mass.   Neurological: Alert and oriented x 4. CN II-XII grossly intact. No focal weakness. Strength intact 5/5 bilaterally in upper and lower extremities.   Skin: Warm and dry, no rashes.  Musculoskeletal:Musculoskeletal: Extremities are non-tender, non-swollen and have full range of motion. Back NTTP along the midline.     DIFFERENTIAL DIAGNOSIS: After history and physical exam a differential diagnosis was considered, but was not limited to, GERD, aspiration, viral, pulmonary infectious process, COPD, asthma, pulmonary embolus and congestive heart failure.          Initial management: This is a 33 yo male who seems to be having some issues with GERD at night. Tonight felt like he was choking and felt like he may have aspirated. I will obtain Xray on neck and CXR to further assess. He is appropriate, sats normal on RA, not hypoxic, not tachypnic.           X-Ray Neck Soft Tissue   Final Result      No significant radiographic abnormality.  Further evaluation as warranted clinically.         Electronically signed by: Josselyn Colin MD   Date:    02/05/2020   Time:    02:54      X-Ray Chest PA And Lateral   Final Result      No radiographic evidence of acute intra-thoracic process.         Electronically signed by: Josselyn Colin MD   Date:    02/05/2020   Time:    02:52                     ED Course                 Medical Decision Making:   History:   I obtained history from:  patient.  Old Medical Records: I decided to obtain old medical records. Seen 2/1/20 for corneal abrasion.       Radiological Study: Ordered and Reviewed      ED Management:  Lm Yip  presents to the emergency Department today with sensation of choking, He's having sensation of GERD and acid reflux at night. His neck Xray shows no FB, no narrowing of the anatomy. His CXR is normal no sign of aspiration. I will discharge home on Protonix and have him follow up with PCP. He is stable, satting appropriately on room air, appropriate for discharge home. The pt is comfortable with this plan and comfortable going home at this time. After taking into careful account the historical factors and physical exam findings of the patient's presentation today, in conjunction with the empirical and objective data obtained on ED workup, no acute emergent medical condition requiring admission has been identified. The patient appears to be low risk for an emergent medical condition and I feel it is safe and appropriate at this time for the patient to be discharged to follow-up as detailed in their discharge instructions for reevaluation and possible continued outpatient workup and management. Regardless, an unremarkable evaluation in the ED does not preclude the development or presence of a serious or life threatening condition. As such, patient was instructed to return immediately for any worsening or change in current symptoms. Precautions for return discussed at length.  Discharge and follow-up instructions discussed with the patient who expressed understanding and willingness to comply with my recommendations.    Voice recognition software utilized in this note.              Impression      The primary encounter diagnosis was Gastroesophageal reflux disease, esophagitis presence not specified. Diagnoses of SOB  (shortness of breath) and Choking sensation were also pertinent to this visit.                Discharge Medication List as of 2/5/2020  3:09 AM      START taking these medications    Details   pantoprazole (PROTONIX) 20 MG tablet Take 1 tablet (20 mg total) by mouth once daily., Starting Wed 2/5/2020, Until Thu 2/4/2021, Print                      Ang Rojas MD  02/06/20 0205

## 2020-05-22 ENCOUNTER — LAB VISIT (OUTPATIENT)
Dept: PRIMARY CARE CLINIC | Facility: CLINIC | Age: 35
End: 2020-05-22
Payer: OTHER GOVERNMENT

## 2020-05-22 DIAGNOSIS — R05.9 COUGH: Primary | ICD-10-CM

## 2020-05-22 PROCEDURE — U0003 INFECTIOUS AGENT DETECTION BY NUCLEIC ACID (DNA OR RNA); SEVERE ACUTE RESPIRATORY SYNDROME CORONAVIRUS 2 (SARS-COV-2) (CORONAVIRUS DISEASE [COVID-19]), AMPLIFIED PROBE TECHNIQUE, MAKING USE OF HIGH THROUGHPUT TECHNOLOGIES AS DESCRIBED BY CMS-2020-01-R: HCPCS

## 2020-05-24 DIAGNOSIS — U07.1 COVID-19 VIRUS DETECTED: ICD-10-CM

## 2020-05-24 LAB — SARS-COV-2 RNA RESP QL NAA+PROBE: DETECTED

## 2020-05-25 ENCOUNTER — NURSE TRIAGE (OUTPATIENT)
Dept: ADMINISTRATIVE | Facility: CLINIC | Age: 35
End: 2020-05-25

## 2020-05-25 NOTE — TELEPHONE ENCOUNTER
Pt c/o past dizziness and headaches. Pt denies any sob, chest pain or current fever. Pt denies current dizziness or headaches. Advised pt to stay hydrated and get plenty of rest per care advise. Advised pt to continue to monitor and care for symptoms at home. Reviewed when to seek medical attention. Pt verbalized understanding.    used for conversation.    Reason for Disposition   [1] COVID-19 infection diagnosed or suspected AND [2] mild symptoms (fever, cough) AND [3] no trouble breathing or other complications    Additional Information   Negative: SEVERE difficulty breathing (e.g., struggling for each breath, speaks in single words)   Negative: Difficult to awaken or acting confused (e.g., disoriented, slurred speech)   Negative: Bluish (or gray) lips or face now   Negative: Shock suspected (e.g., cold/pale/clammy skin, too weak to stand, low BP, rapid pulse)   Negative: Sounds like a life-threatening emergency to the triager   Negative: [1] COVID-19 suspected (e.g., cough, fever, shortness of breath) AND [2] mild symptoms AND [3] public health department recommends testing   Negative: [1] COVID-19 exposure AND [2] no symptoms   Negative: COVID-19 and Breastfeeding, questions about   Negative: SEVERE or constant chest pain (Exception: mild central chest pain, present only when coughing)   Negative: MODERATE difficulty breathing (e.g., speaks in phrases, SOB even at rest, pulse 100-120)   Negative: Patient sounds very sick or weak to the triager   Negative: MILD difficulty breathing (e.g., minimal/no SOB at rest, SOB with walking, pulse <100)   Negative: Chest pain   Negative: Fever > 103 F (39.4 C)   Negative: [1] Fever > 101 F (38.3 C) AND [2] age > 60   Negative: [1] Fever > 100.0 F (37.8 C) AND [2] bedridden (e.g., nursing home patient, CVA, chronic illness, recovering from surgery)   Negative: HIGH RISK patient (e.g., age > 64 years, diabetes, heart or lung disease, weak immune  system)   Negative: Fever present > 3 days (72 hours)   Negative: [1] Fever returns after gone for over 24 hours AND [2] symptoms worse or not improved   Negative: [1] Continuous (nonstop) coughing interferes with work or school AND [2] no improvement using cough treatment per protocol   Negative: Cough present > 3 weeks    Protocols used: CORONAVIRUS (COVID-19) DIAGNOSED OR [id number]

## 2020-05-27 ENCOUNTER — NURSE TRIAGE (OUTPATIENT)
Dept: ADMINISTRATIVE | Facility: CLINIC | Age: 35
End: 2020-05-27

## 2020-05-27 NOTE — TELEPHONE ENCOUNTER
Reason for Disposition   MILD difficulty breathing (e.g., minimal/no SOB at rest, SOB with walking, pulse <100)    Additional Information   Negative: Difficult to awaken or acting confused (e.g., disoriented, slurred speech)   Negative: Shock suspected (e.g., cold/pale/clammy skin, too weak to stand, low BP, rapid pulse)   Negative: Sounds like a life-threatening emergency to the triager   Negative: SEVERE difficulty breathing (e.g., struggling for each breath, speaks in single words)   Negative: Bluish (or gray) lips or face now   Negative: [1] COVID-19 suspected (e.g., cough, fever, shortness of breath) AND [2] public health department recommends testing   Negative: [1] COVID-19 exposure AND [2] no symptoms   Negative: COVID-19 and Breastfeeding, questions about   Negative: SEVERE or constant chest pain (Exception: mild central chest pain, present only when coughing)   Negative: MODERATE difficulty breathing (e.g., speaks in phrases, SOB even at rest, pulse 100-120)   Negative: Chest pain   Negative: Patient sounds very sick or weak to the triager   Negative: Fever > 103 F (39.4 C)   Negative: [1] Fever > 101 F (38.3 C) AND [2] age > 60   Negative: [1] Fever > 100.0 F (37.8 C) AND [2] bedridden (e.g., nursing home patient, CVA, chronic illness, recovering from surgery)   Negative: HIGH RISK patient (e.g., age > 64 years, diabetes, heart or lung disease, weak immune system)   Negative: Cough present > 3 weeks   Negative: [1] Continuous (nonstop) coughing interferes with work or school AND [2] no improvement using cough treatment per protocol   Negative: [1] Fever returns after gone for over 24 hours AND [2] symptoms worse or not improved   Negative: Fever present > 3 days (72 hours)   Negative: COVID-19 Testing, questions about   Negative: [1] COVID-19 infection diagnosed or suspected AND [2] mild symptoms (fever, cough) AND [3] no trouble breathing or other complications   Negative:  COVID-19, questions about   Negative: COVID-19 Home Isolation, questions about   Negative: COVID-19 Prevention and Healthy Living, questions about    Protocols used: CORONAVIRUS (COVID-19) DIAGNOSED OR [id number]

## 2020-05-27 NOTE — TELEPHONE ENCOUNTER
Spoke with patient on behalf of Covid Symptom Monitoring via  service. Pt c/o of loss of taste and smell, mild cough, SOB on exertion and body aches. Stated that he lives with his wife and daughter who are both positive as well. Disposition calls for UC/ED visit. Patient states he will not go there because his medicaid is out and he has not money. Stated that public health stated that he could get assistance with getting food and medicine but that he has not received anything yet. Pt does not even have tylenol or otc cough medication in the house. Phone number given to contact [facility] health. Advised patient to go to ED or UC if his symptoms worsen.

## 2020-05-29 ENCOUNTER — NURSE TRIAGE (OUTPATIENT)
Dept: ADMINISTRATIVE | Facility: CLINIC | Age: 35
End: 2020-05-29

## 2020-05-29 NOTE — TELEPHONE ENCOUNTER
Called pt via language line   Pt stated that public health stated that he could get assistance with getting food and medicine but that he has not received anything yet. Pt does not even have tylenol or otc cough medication in the house.  Provided patient with a phone number to [facility] to try and assist with his needs.    Reason for Disposition   General information question, no triage required and triager able to answer question    Protocols used: INFORMATION ONLY CALL-A-AH

## 2020-05-31 ENCOUNTER — NURSE TRIAGE (OUTPATIENT)
Dept: ADMINISTRATIVE | Facility: CLINIC | Age: 35
End: 2020-05-31

## 2020-05-31 NOTE — TELEPHONE ENCOUNTER
Patient reports 8/10 headache with severe pain in his right eye with associated blurry vision. Patient instructed to proceed to the nearest Emergency Room per Headache protocol. Patient verbalized understanding and is in agreement to plan.    Reason for Disposition   Severe pain in one eye    Additional Information   Negative: SEVERE difficulty breathing (e.g., struggling for each breath, speaks in single words)   Negative: Difficult to awaken or acting confused (e.g., disoriented, slurred speech)   Negative: Bluish (or gray) lips or face now   Negative: Shock suspected (e.g., cold/pale/clammy skin, too weak to stand, low BP, rapid pulse)   Negative: Sounds like a life-threatening emergency to the triager   Negative: [1] COVID-19 suspected (e.g., cough, fever, shortness of breath) AND [2] mild symptoms AND [3] public health department recommends testing   Negative: [1] COVID-19 exposure AND [2] no symptoms   Negative: COVID-19 and Breastfeeding, questions about   Negative: SEVERE or constant chest pain (Exception: mild central chest pain, present only when coughing)   Negative: MODERATE difficulty breathing (e.g., speaks in phrases, SOB even at rest, pulse 100-120)   Negative: Patient sounds very sick or weak to the triager   Negative: MILD difficulty breathing (e.g., minimal/no SOB at rest, SOB with walking, pulse <100)   Negative: Chest pain   Negative: Fever > 103 F (39.4 C)   Negative: [1] Fever > 101 F (38.3 C) AND [2] age > 60   Negative: [1] Fever > 100.0 F (37.8 C) AND [2] bedridden (e.g., nursing home patient, CVA, chronic illness, recovering from surgery)   Negative: HIGH RISK patient (e.g., age > 64 years, diabetes, heart or lung disease, weak immune system)   Negative: Fever present > 3 days (72 hours)   Negative: [1] Fever returns after gone for over 24 hours AND [2] symptoms worse or not improved   Negative: [1] Continuous (nonstop) coughing interferes with work or school AND [2]  no improvement using cough treatment per protocol   Negative: Cough present > 3 weeks   Negative: Difficult to awaken or acting confused (e.g., disoriented, slurred speech)   Negative: [1] Weakness of the face, arm or leg on one side of the body AND [2] new onset   Negative: [1] Numbness of the face, arm or leg on one side of the body AND [2] new onset   Negative: [1] Loss of speech or garbled speech AND [2] new onset   Negative: Passed out (i.e., lost consciousness, collapsed and was not responding)   Negative: Sounds like a life-threatening emergency to the triager   Negative: Followed a head injury within last 3 days   Negative: Pregnant   Negative: Traumatic Brain Injury (TBI) is suspected   Negative: Unable to walk, or can only walk with assistance (e.g., requires support)   Negative: Stiff neck (can't touch chin to chest)    Protocols used: HEADACHE-A-AH, CORONAVIRUS (COVID-19) DIAGNOSED OR [id number]

## 2022-06-04 ENCOUNTER — HOSPITAL ENCOUNTER (EMERGENCY)
Facility: HOSPITAL | Age: 37
Discharge: HOME | End: 2022-06-04
Attending: EMERGENCY MEDICINE
Payer: MEDICAID

## 2022-06-04 VITALS
TEMPERATURE: 99 F | DIASTOLIC BLOOD PRESSURE: 78 MMHG | HEIGHT: 66 IN | WEIGHT: 215 LBS | SYSTOLIC BLOOD PRESSURE: 128 MMHG | RESPIRATION RATE: 16 BRPM | OXYGEN SATURATION: 96 % | BODY MASS INDEX: 34.55 KG/M2 | HEART RATE: 80 BPM

## 2022-06-04 DIAGNOSIS — J02.9 VIRAL PHARYNGITIS: Primary | ICD-10-CM

## 2022-06-04 LAB
CTP QC/QA: YES
GROUP A STREP, MOLECULAR: NEGATIVE
SARS-COV-2 RDRP RESP QL NAA+PROBE: NEGATIVE

## 2022-06-04 PROCEDURE — 25000003 PHARM REV CODE 250: Performed by: EMERGENCY MEDICINE

## 2022-06-04 PROCEDURE — 87651 STREP A DNA AMP PROBE: CPT | Performed by: NURSE PRACTITIONER

## 2022-06-04 PROCEDURE — 99283 EMERGENCY DEPT VISIT LOW MDM: CPT | Mod: 25

## 2022-06-04 PROCEDURE — U0002 COVID-19 LAB TEST NON-CDC: HCPCS | Performed by: NURSE PRACTITIONER

## 2022-06-04 RX ORDER — IBUPROFEN 800 MG/1
800 TABLET ORAL EVERY 6 HOURS PRN
Qty: 20 TABLET | Refills: 0 | OUTPATIENT
Start: 2022-06-04 | End: 2023-07-11

## 2022-06-04 RX ORDER — IBUPROFEN 400 MG/1
800 TABLET ORAL
Status: COMPLETED | OUTPATIENT
Start: 2022-06-04 | End: 2022-06-04

## 2022-06-04 RX ADMIN — IBUPROFEN 800 MG: 400 TABLET, FILM COATED ORAL at 10:06

## 2022-06-05 NOTE — ED PROVIDER NOTES
"History       Chief Complaint   Patient presents with    Sore Throat     Sore throat x2 days and body aches.Denies any fever or chest or SOB.        HPI:    Lm Yip 36 y.o. presents to the emergency department today with a complaint of sore throat, that all started 2 days ago. No fever, no chills,   Describes sore throat as scratchy.  They are able to eat and drink although it causes pain.  No difficulty with swallowing.  No difficulty tolerating their own secretions.  No change in voice.  Denies any excessive fatigue, nasal congestion, cough, shortness of breath, nausea, or vomiting.      ROS    Constitutional: No fever, no chills.  Eyes: No discharge. No pain.  HENT: No nasal drainage.   Cardiovascular: No chest pain, no palpitations.  Respiratory: No cough, no shortness of breath.  Gastrointestinal: No abdominal pain, no vomiting. No diarrhea.  Genitourinary: No hematuria, dysuria, urgency.  Musculoskeletal: No back pain.   Skin: No rashes, no lesions.  Neurological: No headache, no focal weakness.    Otherwise remaining ROS negative     The history is provided by the patient      ALLERGIES REVIEWED  MEDICATIONS REVIEWED  PMH/PSH/SOC/FH REVIEWED     No past medical history on file.  Past Surgical History:   Procedure Laterality Date    APPENDECTOMY       No family history on file.  Social History     Tobacco Use    Smoking status: Never Smoker    Smokeless tobacco: Never Used   Substance Use Topics    Alcohol use: No    Drug use: No            Physical Exam   /78 (BP Location: Left arm, Patient Position: Sitting)   Pulse 80   Temp 98.6 °F (37 °C) (Oral)   Resp 16   Ht 5' 6" (1.676 m)   Wt 97.5 kg (215 lb)   SpO2 96%   BMI 34.70 kg/m²     Nursing/Ancillary staff note reviewed.  VS reviewed    Physical Exam    General Appearance: The patient is alert, has no immediate need for airway protection and no signs of toxicity. No acute distress. Lying in bed but able to sit up without " difficulty.  No muffled voice or hot potato voice.  HEENT: Eyes: Pupils equal and round no pallor or injection. Extra ocular movements intact. No drainage.       Mouth: Mucous membranes are moist. Oropharynx clear.  There is erythema noted in the posterior pharynx.  No exudates.  Tonsils are equal, no edema. No sign of peritonsillar or tonsillar abscess.  The retropharyngeal space is normal. No sign of abscess.  Uvula midline with no edema.  Neck: Neck is supple non-tender. Positive cervical adenopathy.  No stridor.   Respiratory: There are no retractions, lungs are clear to auscultation. No wheezing, no crackles. Chest wall nontender to palpation.   Cardiovascular: Regular rate and rhythm. No murmurs, rubs or gallops.  Gastrointestinal:  Abdomen is soft and non-tender, no masses, bowel sounds normal. No guarding, no rebound.  No pulsatile mass.   Neurological: Alert and oriented x 4. CN II-XII grossly intact. No focal weakness. Strength intact 5/5 bilaterally in upper and lower extremities.   Skin: Warm and dry, no rashes.  Musculoskeletal:  Extremities are non-tender, non-swollen and have full range of motion. Back NTTP along the midline.     DIFFERENTIAL DIAGNOSIS: After history and physical exam a differential diagnosis was considered, but was not limited to, pharyngitis, strep throat, tonsillitis, abscess, otitis, sinusitis, URI          Initial management: Lm Yip presents with sore throat. No sign of abscess on exam. Will obtain strep swab,. covid swab.        I decided to obtain old records. Reviewed and summarized the old medical record and it showed :            I independently reviewed the labs and it showed the following: COVID Neg, Strep Neg             ED Course     [facility]    Lm Yip 36 y.o. presents to the ED today with sore throat. Strep swab is negative. COVID Neg.  The pt has no airway compromise. They are stable and appropriate for discharge home. We have discussed symptom control  and will have them follow up with PCP.  The pt is comfortable with this plan and comfortable going home at this time. After taking into careful account the historical factors and physical exam findings of the patient's presentation today, in conjunction with the empirical and objective data obtained on ED workup, no acute emergent medical condition requiring admission has been identified. The patient appears to be low risk for an emergent medical condition and I feel it is safe and appropriate at this time for the patient to be discharged to follow-up as detailed in their discharge instructions for reevaluation and possible continued outpatient workup and management. Regardless, an unremarkable evaluation in the ED does not preclude the development or presence of a serious or life threatening condition. As such, patient was instructed to return immediately for any worsening or change in current symptoms. Precautions for return discussed at length.  Discharge and follow-up instructions discussed with the patient who expressed understanding and willingness to comply with my recommendations.    Voice recognition software utilized in this note.                   Impression        The encounter diagnosis was Viral pharyngitis.        .  New Prescriptions    IBUPROFEN (ADVIL,MOTRIN) 800 MG TABLET    Take 1 tablet (800 mg total) by mouth every 6 (six) hours as needed for Pain (or fever).        Follow-up Information     Your PCP In 1 week.    Why: As needed           [facility]. Schedule an appointment as soon as possible for a visit in 1 week.    Specialties: Child and Adolescent Psychiatry, Psychology, Family Medicine, Obstetrics  Why: As needed  Contact information:  [address]  [address]  [place] [address] [phone]                               Patient advised to follow-up with PCP within 3 days for BP re-check if Blood Pressure was > 120/80 without history of hypertension        Ang Rojas MD  06/04/22 4003

## 2022-06-05 NOTE — DISCHARGE INSTRUCTIONS
You have been seen today for sore throat. This is likely a viral etiology. Your strep test was negative, no need for antibiotics at this time.  You can gargle with salt water for symptom control.  You can use lozenges such as Sucrets for symptom control.  You can use a throat spray such as Chloraseptic for symptom control.  Warm tea can help soothe your sore throat. Honey can soothe a sore throat. Return to the emergency department if you cannot swallow, few have difficulty breathing, high fevers that do not respond to medications or any other concerns.  Please refer the distal materials above for further information.      Le trujlilo visto hoy por dolor de garganta. Esta es probablemente ronda etiología viral. Ortiz prueba de estreptococo fue negativa, no necesita antibióticos en wendy momento. Puede hacer gárgaras con agua salada para controlar los síntomas. Puede usar pastillas jaylan Sucrets para controlar los síntomas. Puede usar un aerosol para la garganta jaylan Chloraseptic para controlar los síntomas. El té tibio puede ayudar a calmar el dolor de garganta. La miel puede aliviar el dolor de garganta. Regrese al departamento de emergencias si no puede tragar, algunos tienen dificultad para respirar, fiebre nicolasa que no responde a los medicamentos o cualquier otra inquietud. Consulte los materiales distales anteriores para obtener más información.

## 2022-06-05 NOTE — ED TRIAGE NOTES
Cynthia Anand# [id number]    Patient presents to the ED with complaints of sore throat and body aches x 2 days. States his daughter has been feeling bad at home with SOB. Daughter was not swabbed. Nurse let patient know he was negative for Covid and Strep throat. Patient has had first two doses of Covid vaccine.    Review of patient's allergies indicates:   Allergen Reactions    Penicillins Swelling        Patient has verified the spelling of their name and  on armband.   APPEARANCE: Patient is alert, calm, oriented x 4, and does not appear distressed.  SKIN: Skin is normal for race, warm, and dry. Normal skin turgor and mucous membranes moist.  CARDIAC: Normal rate and rhythm, no murmur heard.   RESPIRATORY:Normal rate and effort. Breath sounds clear bilaterally throughout chest. Respirations are equal and unlabored.    GASTRO: Bowel sounds normal, abdomen is soft, no tenderness, and no abdominal distention.  MUSCLE: Full ROM. No bony tenderness or soft tissue tenderness. No obvious deformity. +body aches  ENT: EARS: no obvious drainage. NOSE: no active bleeding. THROAT: no redness or swelling. +sore throat

## 2022-10-11 ENCOUNTER — HOSPITAL ENCOUNTER (EMERGENCY)
Facility: HOSPITAL | Age: 37
Discharge: HOME | End: 2022-10-11
Attending: EMERGENCY MEDICINE
Payer: MEDICAID

## 2022-10-11 VITALS
SYSTOLIC BLOOD PRESSURE: 118 MMHG | DIASTOLIC BLOOD PRESSURE: 77 MMHG | OXYGEN SATURATION: 97 % | RESPIRATION RATE: 18 BRPM | WEIGHT: 210 LBS | HEIGHT: 67 IN | TEMPERATURE: 98 F | BODY MASS INDEX: 32.96 KG/M2 | HEART RATE: 75 BPM

## 2022-10-11 DIAGNOSIS — J30.2 SEASONAL ALLERGIES: Primary | ICD-10-CM

## 2022-10-11 DIAGNOSIS — R05.9 COUGH: ICD-10-CM

## 2022-10-11 PROCEDURE — 99283 EMERGENCY DEPT VISIT LOW MDM: CPT | Mod: 25

## 2022-10-11 RX ORDER — BENZONATATE 100 MG/1
100 CAPSULE ORAL 3 TIMES DAILY PRN
Qty: 20 CAPSULE | Refills: 0 | Status: SHIPPED | OUTPATIENT
Start: 2022-10-11 | End: 2022-10-18

## 2022-10-11 RX ORDER — FLUTICASONE PROPIONATE 50 MCG
1 SPRAY, SUSPENSION (ML) NASAL 2 TIMES DAILY PRN
Qty: 15 G | Refills: 0 | Status: SHIPPED | OUTPATIENT
Start: 2022-10-11 | End: 2022-10-18

## 2022-10-11 RX ORDER — CETIRIZINE HYDROCHLORIDE 10 MG/1
10 TABLET ORAL DAILY
Qty: 20 TABLET | Refills: 0 | Status: SHIPPED | OUTPATIENT
Start: 2022-10-11 | End: 2022-10-31

## 2022-10-12 NOTE — ED PROVIDER NOTES
Encounter Date: 10/11/2022       History     Chief Complaint   Patient presents with    Cough     37 y.o. male to ED with c.o. cough, headache, and generalized weakness  x 9 days. Patient denies n/v/d, denies fever/chills, denies endorses mild shortness of breath at night when he is laying down. Patient denies all other medical complaints at this time.     Lm Yip is a 37 y.o. male who  has no past medical history on file.    The patient presents to the ED due to cough, congestion, body aches, headache.   Patient reports symptoms started 1 week ago after the weather became colder. He has been taking ibuprofen which only helps for about 4 hours. He has had similar symptoms in the past with changes in seasons. He denies any fever, N/V/D, CP, SOB, abdominal pain.   No known sick contacts.   He is mainly concerned about having pneumonia.   No medical history, takes no medications. No other complaints or concerns.     Review of patient's allergies indicates:   Allergen Reactions    Penicillins Swelling     No past medical history on file.  Past Surgical History:   Procedure Laterality Date    APPENDECTOMY       No family history on file.  Social History     Tobacco Use    Smoking status: Never    Smokeless tobacco: Never   Substance Use Topics    Alcohol use: No    Drug use: No     Review of Systems   Constitutional:  Negative for chills and fever.   HENT:  Positive for congestion. Negative for sore throat.    Respiratory:  Positive for cough. Negative for shortness of breath.    Cardiovascular:  Negative for chest pain.   Gastrointestinal:  Negative for constipation, diarrhea, nausea and vomiting.   Genitourinary:  Negative for dysuria, frequency and urgency.   Musculoskeletal:  Positive for myalgias. Negative for back pain.   Skin:  Negative for rash and wound.   Neurological:  Positive for headaches. Negative for weakness.   Hematological:  Does not bruise/bleed easily.   Psychiatric/Behavioral:  Negative  for agitation, behavioral problems and confusion.      Physical Exam     Initial Vitals [10/11/22 2127]   BP Pulse Resp Temp SpO2   124/72 70 16 98.1 °F (36.7 °C) 96 %      MAP       --         Physical Exam    Nursing note and vitals reviewed.  Constitutional: He appears well-developed and well-nourished. He is not diaphoretic. No distress.   Well-appearing, no distress.    HENT:   Head: Normocephalic and atraumatic. Head is without abrasion and without contusion.   Mouth/Throat: Oropharynx is clear and moist.   Eyes: EOM are normal. Pupils are equal, round, and reactive to light.   Neck: No tracheal deviation present.   Cardiovascular:  Normal rate, regular rhythm, normal heart sounds and intact distal pulses.           Pulmonary/Chest: Breath sounds normal. No stridor. No respiratory distress.   Abdominal: Abdomen is soft. He exhibits no distension and no mass. There is no abdominal tenderness.   Musculoskeletal:         General: No edema. Normal range of motion.     Neurological: He is alert and oriented to person, place, and time. No cranial nerve deficit or sensory deficit.   Skin: Skin is warm and dry. Capillary refill takes less than 2 seconds. No rash noted.   Psychiatric: He has a normal mood and affect. His behavior is normal. Thought content normal.       ED Course   Procedures  Labs Reviewed - No data to display       Imaging Results              X-Ray Chest PA And Lateral (Final result)  Result time 10/11/22 23:03:43      Final result by Sloan Nick MD (10/11/22 23:03:43)                   Impression:      Mild bilateral basilar ground-glass infiltrate, superimposed on diminished depth of inspiration and atelectatic change.      Electronically signed by: Sloan Nick  Date:    10/11/2022  Time:    23:03               Narrative:    EXAMINATION:  XR CHEST PA AND LATERAL    CLINICAL HISTORY:  Cough, unspecified    TECHNIQUE:  PA and lateral views of the chest were performed.    COMPARISON:  Chest  radiograph February 5, 2020    FINDINGS:  Two views of the chest are submitted.  There is diminished depth of inspiration, when accounting for position and technique the appearance of the cardiomediastinal silhouette is stable.    Accentuation of pulmonary bronchovascular markings consistent with diminished depth of inspiration noted.  There is appearance suggesting superimposed mild bilateral basilar ground-glass infiltrate without dense consolidation.  There is no evidence for pleural effusion there is no evidence for pneumothorax.  The osseous structures appear intact.                                       Medications - No data to display  Medical Decision Making:   History:   Old Medical Records: I decided to obtain old medical records.  Old Records Summarized: records from clinic visits and other records.       <> Summary of Records: Prior ED visits for viral pharyngitis.   Initial Assessment:   36 yo M with URI symptoms. Vitals reassuring, afebrile.   Will obtain CXR and reassess.   Differential Diagnosis:   Differential Diagnosis includes, but is not limited to:  COVID-19 infection, influenza, bacterial sinusitis, allergic rhinitis, bacterial/viral pharyngitis, peritonsillar abscess, retropharyngeal abscess, bacterial/viral pneumonia.    Clinical Tests:   Radiological Study: Ordered and Reviewed  ED Management:  CXR without focal infiltrate or other acute process.  Vitals reassuring, afebrile, no active cough.   Presentation seems most consistent with seasonal allergies.  Will DC with supportive care. Recommend close PCP f/u. Return precautions given.     On re-evaluation, the patient's status has improved.  After complete ED evaluation, clinical impression is most consistent with allergies, viral URI.  PCP follow-up within 2-3 days was recommended.    After taking into careful account the patient's history, physical exam findings, as well as empirical and objective data obtained throughout ED workup, I feel  no emergent medical condition has been identified. No further evaluation or admission was felt to be required, and the patient is stable for discharge from the ED. The patient and any additional family present were updated with test results, overall clinical impression, and recommended further plan of care, including discharge instructions as provided and outpatient follow-up for continued evaluation and management as needed. All questions were answered. The patient expressed understanding and agreed with current plan for discharge and follow-up plan of care. Strict ED return precautions were provided, including return/worsening of current symptoms, new symptoms, or any other concerns.                            Clinical Impression:   Final diagnoses:  [R05.9] Cough  [J30.2] Seasonal allergies (Primary)      ED Disposition Condition    Discharge Stable          ED Prescriptions       Medication Sig Dispense Start Date End Date Auth. Provider    cetirizine (ZYRTEC) 10 MG tablet Take 1 tablet (10 mg total) by mouth once daily. for 20 days 20 tablet 10/11/2022 10/31/2022 Gigi Chand MD    fluticasone propionate (FLONASE) 50 mcg/actuation nasal spray 1 spray (50 mcg total) by Each Nostril route 2 (two) times daily as needed for Allergies. 15 g 10/11/2022 10/18/2022 Gigi Chand MD    benzonatate (TESSALON) 100 MG capsule Take 1 capsule (100 mg total) by mouth 3 (three) times daily as needed for Cough. 20 capsule 10/11/2022 10/18/2022 Gigi Chand MD          Follow-up Information       Follow up With Specialties Details Why Contact Info Additional Information    [facility] Family Medicine Schedule an appointment as soon as possible for a visit   [address], [address]  [place] [address] [phone] Please park in [address] or [address] and use Mery turner. [facility] Medical Office Bldg. elevators.             Gigi Chand MD  10/12/22 0028

## 2023-07-11 ENCOUNTER — HOSPITAL ENCOUNTER (EMERGENCY)
Facility: HOSPITAL | Age: 38
Discharge: HOME | End: 2023-07-11
Attending: EMERGENCY MEDICINE
Payer: MEDICAID

## 2023-07-11 VITALS
OXYGEN SATURATION: 97 % | TEMPERATURE: 98 F | BODY MASS INDEX: 32.18 KG/M2 | RESPIRATION RATE: 18 BRPM | SYSTOLIC BLOOD PRESSURE: 137 MMHG | WEIGHT: 205 LBS | HEART RATE: 71 BPM | DIASTOLIC BLOOD PRESSURE: 74 MMHG

## 2023-07-11 DIAGNOSIS — M79.671 ACUTE FOOT PAIN, RIGHT: Primary | ICD-10-CM

## 2023-07-11 PROCEDURE — 99283 EMERGENCY DEPT VISIT LOW MDM: CPT

## 2023-07-11 PROCEDURE — 25000003 PHARM REV CODE 250: Performed by: EMERGENCY MEDICINE

## 2023-07-11 RX ORDER — IBUPROFEN 400 MG/1
800 TABLET ORAL
Status: COMPLETED | OUTPATIENT
Start: 2023-07-11 | End: 2023-07-11

## 2023-07-11 RX ORDER — IBUPROFEN 800 MG/1
800 TABLET ORAL EVERY 8 HOURS PRN
Qty: 20 TABLET | Refills: 0 | Status: SHIPPED | OUTPATIENT
Start: 2023-07-11

## 2023-07-11 RX ADMIN — IBUPROFEN 800 MG: 400 TABLET ORAL at 09:07

## 2023-07-11 NOTE — ED PROVIDER NOTES
Encounter Date: 7/11/2023       History     Chief Complaint   Patient presents with    Foot Injury     Right heal pain that began yesterday, denies falls/trauma, + 2 DP, pt states he wore a shoe without support yesterday, walks with steady gait.      The patient is a 37-year-old male with no previous medical history who presents to the emergency department with right he states his symptoms began while at work yesterday since then, the pain has been an 8/10 in severity when it is very minimal at rest.  He denies any associated numbness, redness, or swelling.  He denies any trauma.  He believes that his symptoms are secondary to the shoes that is wearing.  He has no other complaints.    Review of patient's allergies indicates:   Allergen Reactions    Penicillins Swelling     No past medical history on file.  Past Surgical History:   Procedure Laterality Date    APPENDECTOMY       No family history on file.  Social History     Tobacco Use    Smoking status: Never    Smokeless tobacco: Never   Substance Use Topics    Alcohol use: No    Drug use: No     Review of Systems  See HPI  Physical Exam     Initial Vitals [07/11/23 0809]   BP Pulse Resp Temp SpO2   130/80 67 16 97.8 °F (36.6 °C) 97 %      MAP       --         Physical Exam  General: No apparent distress.  Well-nourished.  Well-developed.  Alert and oriented x3.  Cardiovascular: Brisk capillary fill.  2+ distal pulses.  Skin: No rashes.  No lesions.  No pallor.  No jaundice.  No erythema.  No fluctuance.  No induration.  No drainage.  Neuro: No sensory deficits.  Strength 5 out of 5 in all 4 extremities.  Musculoskeletal:  No swelling.  No ecchymosis.  Full range of motion of the toes, foot, and ankle.  No pain with passive range of motion.  Minimal tenderness to palpation is noted to the posterior, medial, and lateral aspect of the calcaneus.      ED Course   Procedures  Labs Reviewed - No data to display       Imaging Results              X-Ray Foot Complete  Right (Final result)  Result time 07/11/23 09:46:29      Final result by Lalit Funk DO (07/11/23 09:46:29)                   Impression:      Please see above      Electronically signed by: Lalit Funk DO  Date:    07/11/2023  Time:    09:46               Narrative:    EXAMINATION:  XR FOOT COMPLETE 3 VIEW RIGHT    CLINICAL HISTORY:  . Pain in right foot    TECHNIQUE:  AP, lateral, and oblique views of the right foot were performed.    COMPARISON:  01/26/2017    FINDINGS:  No evidence for acute fracture line or dislocation right foot.  Lisfranc articulation is similar somewhat distorted by overlapping structures with oblique positioning similar to prior    Continued slight hallux valgus deformity.  Small posterior calcaneal spur identified.  Further evaluation as warranted clinically.                                       Medications   ibuprofen tablet 800 mg (800 mg Oral Given 7/11/23 0926)     Medical Decision Making:   Initial Assessment:   This is an urgent evaluation.  He is neurovascularly intact distally.  He has no obvious signs of infection.  X-ray has been ordered.  I will provide ibuprofen.  Ace wrap will be applied along with an ice pack.  The patient will be referred to podiatry if the x-ray is negative.  ED Management:  9:51 a.m.  The patient's x-ray is negative for acute issue.  However, there are incidental findings that may be significant.  Based off of the patient's symptoms and x-ray, I believe that his heel pain may be secondary to plantar fasciitis, although his pain is somewhat lateral and posterior as opposed to plantar.  Ace wrap, and ice pack will be provided.  He is neurovascularly intact distal will be referred to Podiatry.  At this time, I feel the patient is clinically stable for discharge.                        Clinical Impression:   Final diagnoses:  [M79.671] Acute foot pain, right (Primary)        ED Disposition Condition    Discharge Stable          ED Prescriptions        Medication Sig Dispense Start Date End Date Auth. Provider    ibuprofen (ADVIL,MOTRIN) 800 MG tablet Take 1 tablet (800 mg total) by mouth every 8 (eight) hours as needed for Pain. 20 tablet 7/11/2023 -- Ken Land MD          Follow-up Information       Follow up With Specialties Details Why Contact Info Additional Information    [facility] In 1 week If symptoms worsen/persist [address], [address]  [facility] [address] [phone] Please park in [address] or [address] and use Mery turner. Take Medical Office Bldg elevators.             Ken Land MD  07/11/23 0896

## 2023-07-11 NOTE — ED NOTES
Assumed care of patient at this time. Patient c/o atraumatic pain to right heel. Updated on care plan. Medicated per MAR. Ace bandage applied to RLE. Pt provided with ice pack. Pt updated on upcoming xray. VU. Denies needs at current. Wctm.

## 2024-02-26 ENCOUNTER — HOSPITAL ENCOUNTER (EMERGENCY)
Facility: HOSPITAL | Age: 39
Discharge: HOME | End: 2024-02-26
Attending: EMERGENCY MEDICINE
Payer: MEDICAID

## 2024-02-26 VITALS
DIASTOLIC BLOOD PRESSURE: 68 MMHG | RESPIRATION RATE: 18 BRPM | SYSTOLIC BLOOD PRESSURE: 121 MMHG | HEIGHT: 67 IN | TEMPERATURE: 98 F | BODY MASS INDEX: 33.74 KG/M2 | OXYGEN SATURATION: 95 % | HEART RATE: 72 BPM | WEIGHT: 215 LBS

## 2024-02-26 DIAGNOSIS — M54.50 LOW BACK PAIN WITHOUT SCIATICA, UNSPECIFIED BACK PAIN LATERALITY, UNSPECIFIED CHRONICITY: ICD-10-CM

## 2024-02-26 DIAGNOSIS — S39.012A LUMBAR STRAIN, INITIAL ENCOUNTER: Primary | ICD-10-CM

## 2024-02-26 PROCEDURE — 63600175 PHARM REV CODE 636 W HCPCS: Performed by: PHYSICIAN ASSISTANT

## 2024-02-26 PROCEDURE — 25000003 PHARM REV CODE 250: Performed by: PHYSICIAN ASSISTANT

## 2024-02-26 PROCEDURE — 99284 EMERGENCY DEPT VISIT MOD MDM: CPT | Mod: 25

## 2024-02-26 PROCEDURE — 96372 THER/PROPH/DIAG INJ SC/IM: CPT | Performed by: PHYSICIAN ASSISTANT

## 2024-02-26 RX ORDER — LIDOCAINE 50 MG/G
1 PATCH TOPICAL DAILY
Qty: 15 PATCH | Refills: 0 | Status: SHIPPED | OUTPATIENT
Start: 2024-02-26

## 2024-02-26 RX ORDER — METHOCARBAMOL 500 MG/1
1000 TABLET, FILM COATED ORAL 3 TIMES DAILY
Qty: 30 TABLET | Refills: 0 | Status: SHIPPED | OUTPATIENT
Start: 2024-02-26 | End: 2024-03-02

## 2024-02-26 RX ORDER — KETOROLAC TROMETHAMINE 30 MG/ML
30 INJECTION, SOLUTION INTRAMUSCULAR; INTRAVENOUS
Status: COMPLETED | OUTPATIENT
Start: 2024-02-26 | End: 2024-02-26

## 2024-02-26 RX ORDER — NAPROXEN 500 MG/1
500 TABLET ORAL 2 TIMES DAILY WITH MEALS
Qty: 20 TABLET | Refills: 0 | Status: SHIPPED | OUTPATIENT
Start: 2024-02-26

## 2024-02-26 RX ORDER — LIDOCAINE 50 MG/G
1 PATCH TOPICAL
Status: DISCONTINUED | OUTPATIENT
Start: 2024-02-26 | End: 2024-02-26 | Stop reason: HOSPADM

## 2024-02-26 RX ADMIN — LIDOCAINE 1 PATCH: 50 PATCH CUTANEOUS at 04:02

## 2024-02-26 RX ADMIN — KETOROLAC TROMETHAMINE 30 MG: 30 INJECTION, SOLUTION INTRAMUSCULAR; INTRAVENOUS at 04:02

## 2024-02-26 NOTE — DISCHARGE INSTRUCTIONS
Seguimiento con el médico de atención primaria que se le haya proporcionado. Utilice ronda almohadilla térmica en el sitio cuando no esté usando el parche de lidocaína. Puede utilizar el relajante muscular y el analgésico según las indicaciones. No conduzca ni trabaje con el relajante muscular. Reduzca el levantamiento de objetos pesados o la actividad extenuante. Regrese a la braulio de emergencias si los síntomas preocupan o empeoran.       Follow-up with primary care doctor provided to you.  Use a heating pad to the site when lidocaine patch is not in use.  You may use the muscle relaxant and pain medication to use as directed.  Do not drive or work with the muscle relaxant.  Reduce heavy lifting or strenuous activity.  Return to the ER with concerning or worsening symptoms.

## 2024-02-26 NOTE — ED PROVIDER NOTES
Encounter Date: 2/26/2024       History     Chief Complaint   Patient presents with    Back Pain     Pt c/o lower back pain in the center. Pt denies any injury or trauma. The pain started yesterday when he bent down to pick something up. Pt denies dysuria.      38-year-old Indonesian-speaking male presents ER for evaluation of lower back pain.  Patient states that yesterday, he bent over to the right side to lift up an object when he hurt his back.  Patient states he initially did not have much pain however when he woke up this morning noticed pain to the lower back.  Denies any radiation of this pain to his lower extremity.  No fall or trauma.  Denies any paresthesia focal weakness.  He has not had any bowel or bladder dysfunction.  No UTI symptoms including dysuria hematuria.  No abdominal pain, nausea vomiting.  No fever chills.  He denies any previous back surgeries.  He took some ibuprofen and a muscle relaxant that a friend gave him with only slight alleviation of pain.    The history is provided by the patient. The history is limited by a language barrier. A  was used.     Review of patient's allergies indicates:   Allergen Reactions    Penicillins Swelling     No past medical history on file.  Past Surgical History:   Procedure Laterality Date    APPENDECTOMY       No family history on file.  Social History     Tobacco Use    Smoking status: Never    Smokeless tobacco: Never   Substance Use Topics    Alcohol use: No    Drug use: No     Review of Systems   Constitutional:  Negative for chills and fever.   HENT:  Negative for congestion.    Eyes:  Negative for visual disturbance.   Respiratory:  Negative for shortness of breath.    Cardiovascular:  Negative for chest pain.   Gastrointestinal:  Negative for nausea and vomiting.   Genitourinary:  Negative for dysuria and flank pain.   Musculoskeletal:  Positive for back pain. Negative for arthralgias and myalgias.   Skin:  Negative for rash.    Allergic/Immunologic: Negative for immunocompromised state.   Neurological:  Negative for weakness and numbness.   Hematological:  Does not bruise/bleed easily.   Psychiatric/Behavioral:  Negative for confusion.        Physical Exam     Initial Vitals [02/26/24 1523]   BP Pulse Resp Temp SpO2   129/62 78 18 97.8 °F (36.6 °C) 96 %      MAP       --         Physical Exam    Vitals reviewed.  Constitutional: He appears well-developed and well-nourished. He is not diaphoretic. No distress.   HENT:   Head: Normocephalic and atraumatic.   Eyes: Conjunctivae and EOM are normal.   Neck: Neck supple.   Cardiovascular:  Normal rate, regular rhythm, normal heart sounds and intact distal pulses.           Pulmonary/Chest: Breath sounds normal. No respiratory distress.   Abdominal: Abdomen is soft. He exhibits no distension. There is no abdominal tenderness. There is no rebound.   Musculoskeletal:         General: Normal range of motion.      Cervical back: Normal and neck supple.      Thoracic back: Normal.      Lumbar back: Tenderness (Paraspinal bilaterally) present. No swelling, edema or bony tenderness. Negative right straight leg raise test and negative left straight leg raise test.     Neurological: He is alert and oriented to person, place, and time. He has normal strength. No sensory deficit. GCS score is 15. GCS eye subscore is 4. GCS verbal subscore is 5. GCS motor subscore is 6.   Skin: Skin is warm.         ED Course   Procedures  Labs Reviewed - No data to display       Imaging Results    None          Medications   ketorolac injection 30 mg (has no administration in time range)   LIDOcaine 5 % patch 1 patch (has no administration in time range)     Medical Decision Making       APC / Resident Notes:   Patient seen in the ER promptly upon arrival.  He is afebrile, no acute distress.  Physical examination reveals some tenderness on palpation to the lower paraspinal musculature.  No spinous process tenderness  noted.  No swelling or erythema to the site.  No pain on straight leg raise.  No focal neurological deficit noted on examination.  Distal pulses are intact and equal bilaterally.  Abdomen is soft, nondistended no CVA tenderness on exam.    Suspect symptoms secondary to muscular strain given the mechanism of injury and location of pain.  Do not feel that requires imaging given absence of neurological deficit or trauma.  Will provide patient with Toradol and lidocaine patch in the ED as he did drive himself to the ER.  Patient was able to ambulate in the ER without difficulty.    Will prescribe home on naproxen, Lidoderm and Robaxin to use as directed.  He was advised to refrain from driving or working with the muscle relaxant provided to him.  Advised to use a heating pad to the site when lidocaine is not in use.  He was advised to reduce heavy lifting or strenuous activity until symptoms have fully resolved which may take several days.  He was however given strict precautions ED which was agreeable to.  Patient is otherwise stable for discharge and close follow-up.    Disclaimer: This note has been generated using voice-recognition software.        ED Course as of 02/26/24 1602   Mon Feb 26, 2024   [id number] ID : [id number] [[id number]]      ED Course User Index  [AJ] Shoshana Neal PA-C                           Clinical Impression:  Final diagnoses:  [S39.012A] Lumbar strain, initial encounter (Primary)  [M54.50] Low back pain without sciatica, unspecified back pain laterality, unspecified chronicity          ED Disposition Condition    Discharge Stable          ED Prescriptions       Medication Sig Dispense Start Date End Date Auth. Provider    methocarbamoL (ROBAXIN) 500 MG Tab Take 2 tablets (1,000 mg total) by mouth 3 (three) times daily. for 5 days 30 tablet 2/26/2024 3/2/2024 Shoshana Neal PA-C    naproxen (NAPROSYN) 500 MG tablet Take 1 tablet (500 mg total) by mouth 2 (two) times daily with meals. 20  tablet 2/26/2024 -- Shoshana Neal PA-C    LIDOcaine (LIDODERM) 5 % Place 1 patch onto the skin once daily. Remove & Discard patch within 12 hours or as directed by MD 15 patch 2/26/2024 -- Shoshana Neal PA-C          Follow-up Information       Follow up With Specialties Details Why Contact Info Additional Information    [facility] Schedule an appointment as soon as possible for a visit   [address]  [facility] [address] [phone] Please park in [address] C or D and use Mery turner. Take Medical Office Bldg. elevators.             Shoshana Neal PA-C  02/26/24 1029